# Patient Record
Sex: MALE | URBAN - METROPOLITAN AREA
[De-identification: names, ages, dates, MRNs, and addresses within clinical notes are randomized per-mention and may not be internally consistent; named-entity substitution may affect disease eponyms.]

---

## 2020-10-12 PROBLEM — Z00.00 ENCOUNTER FOR PREVENTIVE HEALTH EXAMINATION: Status: ACTIVE | Noted: 2020-10-12

## 2020-11-19 ENCOUNTER — OUTPATIENT (OUTPATIENT)
Dept: OUTPATIENT SERVICES | Facility: HOSPITAL | Age: 70
LOS: 1 days | End: 2020-11-19
Payer: MEDICARE

## 2020-11-19 VITALS
OXYGEN SATURATION: 98 % | DIASTOLIC BLOOD PRESSURE: 80 MMHG | WEIGHT: 220.9 LBS | TEMPERATURE: 97 F | HEIGHT: 73 IN | RESPIRATION RATE: 15 BRPM | HEART RATE: 65 BPM | SYSTOLIC BLOOD PRESSURE: 135 MMHG

## 2020-11-19 DIAGNOSIS — Z01.818 ENCOUNTER FOR OTHER PREPROCEDURAL EXAMINATION: ICD-10-CM

## 2020-11-19 DIAGNOSIS — K86.2 CYST OF PANCREAS: ICD-10-CM

## 2020-11-19 DIAGNOSIS — Z98.890 OTHER SPECIFIED POSTPROCEDURAL STATES: Chronic | ICD-10-CM

## 2020-11-19 DIAGNOSIS — E11.9 TYPE 2 DIABETES MELLITUS WITHOUT COMPLICATIONS: ICD-10-CM

## 2020-11-19 DIAGNOSIS — G47.33 OBSTRUCTIVE SLEEP APNEA (ADULT) (PEDIATRIC): ICD-10-CM

## 2020-11-19 LAB
ANION GAP SERPL CALC-SCNC: 12 MMO/L — SIGNIFICANT CHANGE UP (ref 7–14)
BUN SERPL-MCNC: 12 MG/DL — SIGNIFICANT CHANGE UP (ref 7–23)
CALCIUM SERPL-MCNC: 10.4 MG/DL — SIGNIFICANT CHANGE UP (ref 8.4–10.5)
CHLORIDE SERPL-SCNC: 98 MMOL/L — SIGNIFICANT CHANGE UP (ref 98–107)
CO2 SERPL-SCNC: 27 MMOL/L — SIGNIFICANT CHANGE UP (ref 22–31)
CREAT SERPL-MCNC: 0.8 MG/DL — SIGNIFICANT CHANGE UP (ref 0.5–1.3)
GLUCOSE SERPL-MCNC: 144 MG/DL — HIGH (ref 70–99)
HBA1C BLD-MCNC: 7.3 % — HIGH (ref 4–5.6)
HCT VFR BLD CALC: 50.2 % — HIGH (ref 39–50)
HGB BLD-MCNC: 16.1 G/DL — SIGNIFICANT CHANGE UP (ref 13–17)
MCHC RBC-ENTMCNC: 29.7 PG — SIGNIFICANT CHANGE UP (ref 27–34)
MCHC RBC-ENTMCNC: 32.1 % — SIGNIFICANT CHANGE UP (ref 32–36)
MCV RBC AUTO: 92.6 FL — SIGNIFICANT CHANGE UP (ref 80–100)
NRBC # FLD: 0 K/UL — SIGNIFICANT CHANGE UP (ref 0–0)
PLATELET # BLD AUTO: 285 K/UL — SIGNIFICANT CHANGE UP (ref 150–400)
PMV BLD: 9.2 FL — SIGNIFICANT CHANGE UP (ref 7–13)
POTASSIUM SERPL-MCNC: 4.4 MMOL/L — SIGNIFICANT CHANGE UP (ref 3.5–5.3)
POTASSIUM SERPL-SCNC: 4.4 MMOL/L — SIGNIFICANT CHANGE UP (ref 3.5–5.3)
RBC # BLD: 5.42 M/UL — SIGNIFICANT CHANGE UP (ref 4.2–5.8)
RBC # FLD: 13.3 % — SIGNIFICANT CHANGE UP (ref 10.3–14.5)
SODIUM SERPL-SCNC: 137 MMOL/L — SIGNIFICANT CHANGE UP (ref 135–145)
WBC # BLD: 7.95 K/UL — SIGNIFICANT CHANGE UP (ref 3.8–10.5)
WBC # FLD AUTO: 7.95 K/UL — SIGNIFICANT CHANGE UP (ref 3.8–10.5)

## 2020-11-19 PROCEDURE — 93010 ELECTROCARDIOGRAM REPORT: CPT

## 2020-11-19 RX ORDER — LISINOPRIL 2.5 MG/1
1 TABLET ORAL
Qty: 0 | Refills: 0 | DISCHARGE

## 2020-11-19 RX ORDER — GLIMEPIRIDE 1 MG
1 TABLET ORAL
Qty: 0 | Refills: 0 | DISCHARGE

## 2020-11-19 RX ORDER — TAMSULOSIN HYDROCHLORIDE 0.4 MG/1
1 CAPSULE ORAL
Qty: 0 | Refills: 0 | DISCHARGE

## 2020-11-19 NOTE — H&P PST ADULT - NEGATIVE OPHTHALMOLOGIC SYMPTOMS
no lacrimation L/no pain L/no blurred vision R/no lacrimation R/no discharge R/no irritation L/no diplopia/no loss of vision R/no photophobia/no blurred vision L/no irritation R/no discharge L/no loss of vision L/no pain R

## 2020-11-19 NOTE — H&P PST ADULT - NSICDXPROBLEM_GEN_ALL_CORE_FT
PROBLEM DIAGNOSES  Problem: Cyst of pancreas  Assessment and Plan: Scheduled for endoscopic ultrasound    Problem: Diabetes mellitus  Assessment and Plan: Monitor BS on day of surgery. Pt instructed not to take any diabetes medications on day of surgery. Pt verbalized understanding    Problem: INGRID on CPAP  Assessment and Plan: OR booking notified via fax       PROBLEM DIAGNOSES  Problem: Cyst of pancreas  Assessment and Plan: Scheduled for endoscopic ultrasound Fine Needle Aspiration Anestheasia on 11/23/2020. Pre op instructions, famotidine given and explained. Pt verbalized understanding.   Pt confirmed scheduled appt for Covid-19 swab pre op    Problem: Diabetes mellitus  Assessment and Plan: OR booking notified via fax. Monitor BS on day of surgery. Pt instructed not to take any diabetes medications on day of surgery. Pt verbalized understanding    Problem: INGRID on CPAP  Assessment and Plan: OR booking notified via fax

## 2020-11-19 NOTE — H&P PST ADULT - NSICDXPASTMEDICALHX_GEN_ALL_CORE_FT
PAST MEDICAL HISTORY:  BPH (benign prostatic hyperplasia)     Diverticulitis     HTN (hypertension)     INGRID on CPAP     Type 2 diabetes mellitus

## 2020-11-19 NOTE — H&P PST ADULT - NEGATIVE GENERAL GENITOURINARY SYMPTOMS
no bladder infections/no urine discoloration/no dysuria/no flank pain R/no hematuria/no renal colic/no flank pain L

## 2020-11-19 NOTE — H&P PST ADULT - RS GEN PE MLT RESP DETAILS PC
airway patent/no rales/no subcutaneous emphysema/no wheezes/good air movement/clear to auscultation bilaterally/breath sounds equal/no chest wall tenderness/no intercostal retractions/no rhonchi/respirations non-labored

## 2020-11-21 ENCOUNTER — APPOINTMENT (OUTPATIENT)
Dept: DISASTER EMERGENCY | Facility: CLINIC | Age: 70
End: 2020-11-21

## 2020-11-21 ENCOUNTER — LABORATORY RESULT (OUTPATIENT)
Age: 70
End: 2020-11-21

## 2020-11-23 ENCOUNTER — APPOINTMENT (OUTPATIENT)
Dept: GASTROENTEROLOGY | Facility: HOSPITAL | Age: 70
End: 2020-11-23

## 2020-11-23 ENCOUNTER — RESULT REVIEW (OUTPATIENT)
Age: 70
End: 2020-11-23

## 2020-11-23 ENCOUNTER — NON-APPOINTMENT (OUTPATIENT)
Age: 70
End: 2020-11-23

## 2020-11-23 ENCOUNTER — OUTPATIENT (OUTPATIENT)
Dept: OUTPATIENT SERVICES | Facility: HOSPITAL | Age: 70
LOS: 1 days | Discharge: HOME | End: 2020-11-23
Payer: MEDICARE

## 2020-11-23 VITALS
HEART RATE: 70 BPM | OXYGEN SATURATION: 96 % | HEIGHT: 73 IN | RESPIRATION RATE: 20 BRPM | SYSTOLIC BLOOD PRESSURE: 160 MMHG | DIASTOLIC BLOOD PRESSURE: 87 MMHG | WEIGHT: 225.09 LBS | TEMPERATURE: 99 F

## 2020-11-23 VITALS
HEART RATE: 65 BPM | DIASTOLIC BLOOD PRESSURE: 69 MMHG | OXYGEN SATURATION: 97 % | RESPIRATION RATE: 17 BRPM | SYSTOLIC BLOOD PRESSURE: 115 MMHG

## 2020-11-23 DIAGNOSIS — Z98.890 OTHER SPECIFIED POSTPROCEDURAL STATES: Chronic | ICD-10-CM

## 2020-11-23 DIAGNOSIS — K86.2 CYST OF PANCREAS: ICD-10-CM

## 2020-11-23 LAB
GLUCOSE BLDC GLUCOMTR-MCNC: 143 MG/DL — HIGH (ref 70–99)
GLUCOSE BLDC GLUCOMTR-MCNC: 160 MG/DL — HIGH (ref 70–99)

## 2020-11-23 PROCEDURE — 43242 EGD US FINE NEEDLE BX/ASPIR: CPT

## 2020-11-23 PROCEDURE — 88307 TISSUE EXAM BY PATHOLOGIST: CPT | Mod: 26

## 2020-11-23 RX ORDER — CIPROFLOXACIN HYDROCHLORIDE 500 MG/1
500 TABLET, FILM COATED ORAL
Qty: 6 | Refills: 0 | Status: ACTIVE | COMMUNITY
Start: 2020-11-23 | End: 1900-01-01

## 2020-11-23 NOTE — ASU PATIENT PROFILE, ADULT - PMH
BPH (benign prostatic hyperplasia)    Diverticulitis    HTN (hypertension)    INGRID on CPAP    Type 2 diabetes mellitus

## 2020-11-23 NOTE — ASU PATIENT PROFILE, ADULT - VISION (WITH CORRECTIVE LENSES IF THE PATIENT USUALLY WEARS THEM):
Partially impaired: cannot see medication labels or newsprint, but can see obstacles in path, and the surrounding layout; can count fingers at arm's length/READING

## 2020-11-24 ENCOUNTER — INPATIENT (INPATIENT)
Facility: HOSPITAL | Age: 70
LOS: 3 days | Discharge: HOME | End: 2020-11-28
Attending: STUDENT IN AN ORGANIZED HEALTH CARE EDUCATION/TRAINING PROGRAM | Admitting: STUDENT IN AN ORGANIZED HEALTH CARE EDUCATION/TRAINING PROGRAM
Payer: MEDICARE

## 2020-11-24 VITALS
HEIGHT: 73 IN | HEART RATE: 60 BPM | OXYGEN SATURATION: 97 % | SYSTOLIC BLOOD PRESSURE: 161 MMHG | DIASTOLIC BLOOD PRESSURE: 76 MMHG | RESPIRATION RATE: 16 BRPM | TEMPERATURE: 98 F

## 2020-11-24 DIAGNOSIS — I10 ESSENTIAL (PRIMARY) HYPERTENSION: ICD-10-CM

## 2020-11-24 DIAGNOSIS — Z02.9 ENCOUNTER FOR ADMINISTRATIVE EXAMINATIONS, UNSPECIFIED: ICD-10-CM

## 2020-11-24 DIAGNOSIS — K85.90 ACUTE PANCREATITIS WITHOUT NECROSIS OR INFECTION, UNSPECIFIED: ICD-10-CM

## 2020-11-24 DIAGNOSIS — E11.9 TYPE 2 DIABETES MELLITUS WITHOUT COMPLICATIONS: ICD-10-CM

## 2020-11-24 DIAGNOSIS — K59.00 CONSTIPATION, UNSPECIFIED: ICD-10-CM

## 2020-11-24 DIAGNOSIS — Z98.890 OTHER SPECIFIED POSTPROCEDURAL STATES: Chronic | ICD-10-CM

## 2020-11-24 DIAGNOSIS — Z29.9 ENCOUNTER FOR PROPHYLACTIC MEASURES, UNSPECIFIED: ICD-10-CM

## 2020-11-24 PROBLEM — K57.92 DIVERTICULITIS OF INTESTINE, PART UNSPECIFIED, WITHOUT PERFORATION OR ABSCESS WITHOUT BLEEDING: Chronic | Status: ACTIVE | Noted: 2020-11-19

## 2020-11-24 PROBLEM — N40.0 BENIGN PROSTATIC HYPERPLASIA WITHOUT LOWER URINARY TRACT SYMPTOMS: Chronic | Status: ACTIVE | Noted: 2020-11-19

## 2020-11-24 PROBLEM — G47.33 OBSTRUCTIVE SLEEP APNEA (ADULT) (PEDIATRIC): Chronic | Status: ACTIVE | Noted: 2020-11-19

## 2020-11-24 LAB
ALBUMIN SERPL ELPH-MCNC: 4.4 G/DL — SIGNIFICANT CHANGE UP (ref 3.3–5)
ALP SERPL-CCNC: 55 U/L — SIGNIFICANT CHANGE UP (ref 40–120)
ALT FLD-CCNC: 28 U/L — SIGNIFICANT CHANGE UP (ref 4–41)
ANION GAP SERPL CALC-SCNC: 10 MMO/L — SIGNIFICANT CHANGE UP (ref 7–14)
ANISOCYTOSIS BLD QL: SLIGHT — SIGNIFICANT CHANGE UP
APTT BLD: 30.8 SEC — SIGNIFICANT CHANGE UP (ref 27–36.3)
AST SERPL-CCNC: 19 U/L — SIGNIFICANT CHANGE UP (ref 4–40)
BASE EXCESS BLDV CALC-SCNC: 3 MMOL/L — SIGNIFICANT CHANGE UP
BASOPHILS # BLD AUTO: 0.07 K/UL — SIGNIFICANT CHANGE UP (ref 0–0.2)
BASOPHILS NFR BLD AUTO: 0.4 % — SIGNIFICANT CHANGE UP (ref 0–2)
BASOPHILS NFR SPEC: 0 % — SIGNIFICANT CHANGE UP (ref 0–2)
BILIRUB SERPL-MCNC: 0.7 MG/DL — SIGNIFICANT CHANGE UP (ref 0.2–1.2)
BLD GP AB SCN SERPL QL: NEGATIVE — SIGNIFICANT CHANGE UP
BLOOD GAS VENOUS - CREATININE: 0.83 MG/DL — SIGNIFICANT CHANGE UP (ref 0.5–1.3)
BUN SERPL-MCNC: 10 MG/DL — SIGNIFICANT CHANGE UP (ref 7–23)
CALCIUM SERPL-MCNC: 9.9 MG/DL — SIGNIFICANT CHANGE UP (ref 8.4–10.5)
CHLORIDE BLDV-SCNC: 102 MMOL/L — SIGNIFICANT CHANGE UP (ref 96–108)
CHLORIDE SERPL-SCNC: 97 MMOL/L — LOW (ref 98–107)
CO2 SERPL-SCNC: 28 MMOL/L — SIGNIFICANT CHANGE UP (ref 22–31)
CREAT SERPL-MCNC: 0.77 MG/DL — SIGNIFICANT CHANGE UP (ref 0.5–1.3)
EOSINOPHIL # BLD AUTO: 0.01 K/UL — SIGNIFICANT CHANGE UP (ref 0–0.5)
EOSINOPHIL NFR BLD AUTO: 0.1 % — SIGNIFICANT CHANGE UP (ref 0–6)
EOSINOPHIL NFR FLD: 0 % — SIGNIFICANT CHANGE UP (ref 0–6)
GAS PNL BLDV: 134 MMOL/L — LOW (ref 136–146)
GLUCOSE BLDV-MCNC: 168 MG/DL — HIGH (ref 70–99)
GLUCOSE SERPL-MCNC: 177 MG/DL — HIGH (ref 70–99)
HBA1C BLD-MCNC: 7 % — HIGH (ref 4–5.6)
HCO3 BLDV-SCNC: 25 MMOL/L — SIGNIFICANT CHANGE UP (ref 20–27)
HCT VFR BLD CALC: 48.6 % — SIGNIFICANT CHANGE UP (ref 39–50)
HCT VFR BLDV CALC: 51.2 % — HIGH (ref 39–51)
HGB BLD-MCNC: 16.2 G/DL — SIGNIFICANT CHANGE UP (ref 13–17)
HGB BLDV-MCNC: 16.7 G/DL — SIGNIFICANT CHANGE UP (ref 13–17)
IMM GRANULOCYTES NFR BLD AUTO: 0.5 % — SIGNIFICANT CHANGE UP (ref 0–1.5)
INR BLD: 1.16 — SIGNIFICANT CHANGE UP (ref 0.88–1.16)
LACTATE BLDV-MCNC: 1.7 MMOL/L — SIGNIFICANT CHANGE UP (ref 0.5–2)
LIDOCAIN IGE QN: 385.8 U/L — HIGH (ref 7–60)
LYMPHOCYTES # BLD AUTO: 1.74 K/UL — SIGNIFICANT CHANGE UP (ref 1–3.3)
LYMPHOCYTES # BLD AUTO: 9.7 % — LOW (ref 13–44)
LYMPHOCYTES NFR SPEC AUTO: 1.8 % — LOW (ref 13–44)
MACROCYTES BLD QL: SLIGHT — SIGNIFICANT CHANGE UP
MCHC RBC-ENTMCNC: 30.2 PG — SIGNIFICANT CHANGE UP (ref 27–34)
MCHC RBC-ENTMCNC: 33.3 % — SIGNIFICANT CHANGE UP (ref 32–36)
MCV RBC AUTO: 90.5 FL — SIGNIFICANT CHANGE UP (ref 80–100)
MONOCYTES # BLD AUTO: 1.69 K/UL — HIGH (ref 0–0.9)
MONOCYTES NFR BLD AUTO: 9.4 % — SIGNIFICANT CHANGE UP (ref 2–14)
MONOCYTES NFR BLD: 8 % — SIGNIFICANT CHANGE UP (ref 2–9)
NEUTROPHIL AB SER-ACNC: 86.7 % — HIGH (ref 43–77)
NEUTROPHILS # BLD AUTO: 14.39 K/UL — HIGH (ref 1.8–7.4)
NEUTROPHILS NFR BLD AUTO: 79.9 % — HIGH (ref 43–77)
NEUTS BAND # BLD: 0.9 % — SIGNIFICANT CHANGE UP (ref 0–6)
NRBC # FLD: 0 K/UL — SIGNIFICANT CHANGE UP (ref 0–0)
PCO2 BLDV: 47 MMHG — SIGNIFICANT CHANGE UP (ref 41–51)
PH BLDV: 7.39 PH — SIGNIFICANT CHANGE UP (ref 7.32–7.43)
PLATELET # BLD AUTO: 266 K/UL — SIGNIFICANT CHANGE UP (ref 150–400)
PLATELET COUNT - ESTIMATE: NORMAL — SIGNIFICANT CHANGE UP
PMV BLD: 8.7 FL — SIGNIFICANT CHANGE UP (ref 7–13)
PO2 BLDV: 27 MMHG — LOW (ref 35–40)
POTASSIUM BLDV-SCNC: 3.8 MMOL/L — SIGNIFICANT CHANGE UP (ref 3.4–4.5)
POTASSIUM SERPL-MCNC: 4 MMOL/L — SIGNIFICANT CHANGE UP (ref 3.5–5.3)
POTASSIUM SERPL-SCNC: 4 MMOL/L — SIGNIFICANT CHANGE UP (ref 3.5–5.3)
PROT SERPL-MCNC: 7.6 G/DL — SIGNIFICANT CHANGE UP (ref 6–8.3)
PROTHROM AB SERPL-ACNC: 13.2 SEC — SIGNIFICANT CHANGE UP (ref 10.6–13.6)
RBC # BLD: 5.37 M/UL — SIGNIFICANT CHANGE UP (ref 4.2–5.8)
RBC # FLD: 13.2 % — SIGNIFICANT CHANGE UP (ref 10.3–14.5)
REVIEW TO FOLLOW: YES — SIGNIFICANT CHANGE UP
RH IG SCN BLD-IMP: POSITIVE — SIGNIFICANT CHANGE UP
SAO2 % BLDV: 46 % — LOW (ref 60–85)
SARS-COV-2 RNA SPEC QL NAA+PROBE: SIGNIFICANT CHANGE UP
SODIUM SERPL-SCNC: 135 MMOL/L — SIGNIFICANT CHANGE UP (ref 135–145)
TRIGL SERPL-MCNC: 56 MG/DL — SIGNIFICANT CHANGE UP (ref 10–149)
VARIANT LYMPHS # BLD: 2.6 % — SIGNIFICANT CHANGE UP
WBC # BLD: 17.99 K/UL — HIGH (ref 3.8–10.5)
WBC # FLD AUTO: 17.99 K/UL — HIGH (ref 3.8–10.5)

## 2020-11-24 PROCEDURE — 76705 ECHO EXAM OF ABDOMEN: CPT | Mod: 26

## 2020-11-24 PROCEDURE — 99285 EMERGENCY DEPT VISIT HI MDM: CPT | Mod: 25

## 2020-11-24 PROCEDURE — 99222 1ST HOSP IP/OBS MODERATE 55: CPT | Mod: GC

## 2020-11-24 PROCEDURE — 74019 RADEX ABDOMEN 2 VIEWS: CPT | Mod: 26

## 2020-11-24 PROCEDURE — 74177 CT ABD & PELVIS W/CONTRAST: CPT | Mod: 26

## 2020-11-24 PROCEDURE — 99223 1ST HOSP IP/OBS HIGH 75: CPT | Mod: GC,AI

## 2020-11-24 RX ORDER — HYDROMORPHONE HYDROCHLORIDE 2 MG/ML
1 INJECTION INTRAMUSCULAR; INTRAVENOUS; SUBCUTANEOUS EVERY 6 HOURS
Refills: 0 | Status: DISCONTINUED | OUTPATIENT
Start: 2020-11-24 | End: 2020-11-28

## 2020-11-24 RX ORDER — SODIUM CHLORIDE 9 MG/ML
1000 INJECTION INTRAMUSCULAR; INTRAVENOUS; SUBCUTANEOUS ONCE
Refills: 0 | Status: COMPLETED | OUTPATIENT
Start: 2020-11-24 | End: 2020-11-24

## 2020-11-24 RX ORDER — PIPERACILLIN AND TAZOBACTAM 4; .5 G/20ML; G/20ML
3.38 INJECTION, POWDER, LYOPHILIZED, FOR SOLUTION INTRAVENOUS ONCE
Refills: 0 | Status: COMPLETED | OUTPATIENT
Start: 2020-11-24 | End: 2020-11-24

## 2020-11-24 RX ORDER — DEXTROSE 50 % IN WATER 50 %
25 SYRINGE (ML) INTRAVENOUS ONCE
Refills: 0 | Status: DISCONTINUED | OUTPATIENT
Start: 2020-11-24 | End: 2020-11-25

## 2020-11-24 RX ORDER — SODIUM CHLORIDE 9 MG/ML
1000 INJECTION, SOLUTION INTRAVENOUS
Refills: 0 | Status: DISCONTINUED | OUTPATIENT
Start: 2020-11-24 | End: 2020-11-25

## 2020-11-24 RX ORDER — SENNA PLUS 8.6 MG/1
2 TABLET ORAL AT BEDTIME
Refills: 0 | Status: DISCONTINUED | OUTPATIENT
Start: 2020-11-24 | End: 2020-11-28

## 2020-11-24 RX ORDER — OXYCODONE AND ACETAMINOPHEN 5; 325 MG/1; MG/1
1 TABLET ORAL EVERY 4 HOURS
Refills: 0 | Status: DISCONTINUED | OUTPATIENT
Start: 2020-11-24 | End: 2020-11-28

## 2020-11-24 RX ORDER — HEPARIN SODIUM 5000 [USP'U]/ML
5000 INJECTION INTRAVENOUS; SUBCUTANEOUS EVERY 8 HOURS
Refills: 0 | Status: DISCONTINUED | OUTPATIENT
Start: 2020-11-24 | End: 2020-11-26

## 2020-11-24 RX ORDER — MORPHINE SULFATE 50 MG/1
4 CAPSULE, EXTENDED RELEASE ORAL ONCE
Refills: 0 | Status: DISCONTINUED | OUTPATIENT
Start: 2020-11-24 | End: 2020-11-24

## 2020-11-24 RX ORDER — DEXTROSE 50 % IN WATER 50 %
12.5 SYRINGE (ML) INTRAVENOUS ONCE
Refills: 0 | Status: DISCONTINUED | OUTPATIENT
Start: 2020-11-24 | End: 2020-11-25

## 2020-11-24 RX ORDER — INSULIN LISPRO 100/ML
VIAL (ML) SUBCUTANEOUS
Refills: 0 | Status: DISCONTINUED | OUTPATIENT
Start: 2020-11-24 | End: 2020-11-24

## 2020-11-24 RX ORDER — DEXTROSE 50 % IN WATER 50 %
15 SYRINGE (ML) INTRAVENOUS ONCE
Refills: 0 | Status: DISCONTINUED | OUTPATIENT
Start: 2020-11-24 | End: 2020-11-25

## 2020-11-24 RX ORDER — GLUCAGON INJECTION, SOLUTION 0.5 MG/.1ML
1 INJECTION, SOLUTION SUBCUTANEOUS ONCE
Refills: 0 | Status: DISCONTINUED | OUTPATIENT
Start: 2020-11-24 | End: 2020-11-25

## 2020-11-24 RX ORDER — HYDROMORPHONE HYDROCHLORIDE 2 MG/ML
1 INJECTION INTRAMUSCULAR; INTRAVENOUS; SUBCUTANEOUS ONCE
Refills: 0 | Status: DISCONTINUED | OUTPATIENT
Start: 2020-11-24 | End: 2020-11-24

## 2020-11-24 RX ORDER — TAMSULOSIN HYDROCHLORIDE 0.4 MG/1
0.4 CAPSULE ORAL AT BEDTIME
Refills: 0 | Status: DISCONTINUED | OUTPATIENT
Start: 2020-11-24 | End: 2020-11-28

## 2020-11-24 RX ORDER — SODIUM CHLORIDE 9 MG/ML
1000 INJECTION, SOLUTION INTRAVENOUS
Refills: 0 | Status: DISCONTINUED | OUTPATIENT
Start: 2020-11-24 | End: 2020-11-26

## 2020-11-24 RX ORDER — INSULIN LISPRO 100/ML
VIAL (ML) SUBCUTANEOUS EVERY 6 HOURS
Refills: 0 | Status: DISCONTINUED | OUTPATIENT
Start: 2020-11-24 | End: 2020-11-25

## 2020-11-24 RX ORDER — INSULIN LISPRO 100/ML
VIAL (ML) SUBCUTANEOUS AT BEDTIME
Refills: 0 | Status: DISCONTINUED | OUTPATIENT
Start: 2020-11-24 | End: 2020-11-24

## 2020-11-24 RX ORDER — LISINOPRIL 2.5 MG/1
10 TABLET ORAL DAILY
Refills: 0 | Status: DISCONTINUED | OUTPATIENT
Start: 2020-11-24 | End: 2020-11-28

## 2020-11-24 RX ORDER — POLYETHYLENE GLYCOL 3350 17 G/17G
17 POWDER, FOR SOLUTION ORAL DAILY
Refills: 0 | Status: DISCONTINUED | OUTPATIENT
Start: 2020-11-24 | End: 2020-11-28

## 2020-11-24 RX ORDER — ACETAMINOPHEN 500 MG
650 TABLET ORAL EVERY 6 HOURS
Refills: 0 | Status: DISCONTINUED | OUTPATIENT
Start: 2020-11-24 | End: 2020-11-28

## 2020-11-24 RX ADMIN — MORPHINE SULFATE 4 MILLIGRAM(S): 50 CAPSULE, EXTENDED RELEASE ORAL at 09:59

## 2020-11-24 RX ADMIN — TAMSULOSIN HYDROCHLORIDE 0.4 MILLIGRAM(S): 0.4 CAPSULE ORAL at 21:23

## 2020-11-24 RX ADMIN — SODIUM CHLORIDE 150 MILLILITER(S): 9 INJECTION, SOLUTION INTRAVENOUS at 18:49

## 2020-11-24 RX ADMIN — HYDROMORPHONE HYDROCHLORIDE 1 MILLIGRAM(S): 2 INJECTION INTRAMUSCULAR; INTRAVENOUS; SUBCUTANEOUS at 21:38

## 2020-11-24 RX ADMIN — SODIUM CHLORIDE 1000 MILLILITER(S): 9 INJECTION INTRAMUSCULAR; INTRAVENOUS; SUBCUTANEOUS at 13:48

## 2020-11-24 RX ADMIN — SENNA PLUS 2 TABLET(S): 8.6 TABLET ORAL at 21:23

## 2020-11-24 RX ADMIN — SODIUM CHLORIDE 1000 MILLILITER(S): 9 INJECTION INTRAMUSCULAR; INTRAVENOUS; SUBCUTANEOUS at 09:59

## 2020-11-24 RX ADMIN — PIPERACILLIN AND TAZOBACTAM 200 GRAM(S): 4; .5 INJECTION, POWDER, LYOPHILIZED, FOR SOLUTION INTRAVENOUS at 16:58

## 2020-11-24 RX ADMIN — MORPHINE SULFATE 4 MILLIGRAM(S): 50 CAPSULE, EXTENDED RELEASE ORAL at 13:30

## 2020-11-24 RX ADMIN — HYDROMORPHONE HYDROCHLORIDE 1 MILLIGRAM(S): 2 INJECTION INTRAMUSCULAR; INTRAVENOUS; SUBCUTANEOUS at 14:02

## 2020-11-24 RX ADMIN — MORPHINE SULFATE 4 MILLIGRAM(S): 50 CAPSULE, EXTENDED RELEASE ORAL at 11:30

## 2020-11-24 RX ADMIN — HYDROMORPHONE HYDROCHLORIDE 1 MILLIGRAM(S): 2 INJECTION INTRAMUSCULAR; INTRAVENOUS; SUBCUTANEOUS at 21:23

## 2020-11-24 RX ADMIN — HEPARIN SODIUM 5000 UNIT(S): 5000 INJECTION INTRAVENOUS; SUBCUTANEOUS at 21:23

## 2020-11-24 RX ADMIN — MORPHINE SULFATE 4 MILLIGRAM(S): 50 CAPSULE, EXTENDED RELEASE ORAL at 11:17

## 2020-11-24 RX ADMIN — SODIUM CHLORIDE 1000 MILLILITER(S): 9 INJECTION INTRAMUSCULAR; INTRAVENOUS; SUBCUTANEOUS at 11:17

## 2020-11-24 NOTE — ED PROVIDER NOTE - PHYSICAL EXAMINATION
GEN - NAD; nontoxic appearing; A+O x3   HEAD - NC/AT   EYES- PERRL, EOMI  ENT: Airway patent, mmm, Oral cavity and pharynx normal. No inflammation, swelling, exudate, or lesions.    NECK: Neck supple, no masses.  PULMONARY - CTA b/l, symmetric breath sounds.   CARDIAC -s1s2, RRR, no M,G,R  ABDOMEN - +BS, +mild distention, diffusely tender, soft, no guarding, no rebound, no masses   BACK - no CVA tenderness, Normal  spine   EXTREMITIES - FROM, symmetric pulses, capillary refill < 2 seconds, no edema   SKIN - no rash or bruising   NEUROLOGIC - alert, speech clear, no focal deficits  PSYCH -nl mood/affect, nl insight.

## 2020-11-24 NOTE — ED PROVIDER NOTE - NS ED ROS FT
ROS:  GENERAL: No fever, no chills  EYES: no change in vision  HEENT: no trouble swallowing, no trouble speaking  CARDIAC: no chest pain  PULMONARY: no cough, no shortness of breath  GI: + abdominal pain, + nausea, no vomiting, no diarrhea, no constipation  : No dysuria, no frequency, no change in appearance, or odor of urine  SKIN: no rashes  NEURO: no headache, no weakness  MSK: No joint pain

## 2020-11-24 NOTE — ED PROCEDURE NOTE - ATTENDING CONTRIBUTION TO CARE
I performed a face to face evaluation of this patient and obtained a history and performed a full exam.  I agree with the history, physical exam and plan of the PA.
I performed a face to face evaluation of this patient and obtained a history and performed a full exam.  I agree with the history, physical exam and plan of the PA.

## 2020-11-24 NOTE — H&P ADULT - ATTENDING COMMENTS
70 year old male with hx of HTN, DM2, diverticulosis, BPH who presents with abdominal pain after a pancreatic cyst biopsy yesterday, found to have pancreatitis. Lipase 382 and CT scan with imaging significant for pancreatitis and gallstones. GI following. LR at 150cc/hr, NPO, pain control with dilaudid 1mg q4 hours prn pain. F/u triglycerides and DM2, hold home meds and start ISS. BPH, c/w flomax. HTN. c/w lisinopril. Constipation, start bowel regimen.

## 2020-11-24 NOTE — H&P ADULT - NSHPREVIEWOFSYSTEMS_GEN_ALL_CORE
REVIEW OF SYSTEMS:  CONSTITUTIONAL: No fever, chills, night sweats, or fatigue  EYES: No eye pain, visual disturbances, or discharge  ENMT:  No difficulty hearing, tinnitus, vertigo; No sinus or throat pain  NECK: No pain or stiffness  BREASTS: No pain, masses, or nipple discharge  RESPIRATORY: No cough, wheezing, or hemoptysis; No shortness of breath  CARDIOVASCULAR: No chest pain, palpitations, dizziness, or leg swelling  GASTROINTESTINAL: + abdominal pain, + nausea, No vomiting, or hematemesis; No diarrhea or constipation. No melena or hematochezia.  GENITOURINARY: No dysuria, frequency, hematuria, or incontinence  NEUROLOGICAL: No headaches, memory loss, loss of strength, numbness, or tremors  SKIN: No itching, burning, rashes, or lesions   LYMPH NODES: No enlarged glands  ENDOCRINE: No heat or cold intolerance; No hair loss  MUSCULOSKELETAL: No joint pain or swelling; No muscle, back, or extremity pain  PSYCHIATRIC: No depression, anxiety, mood swings, or difficulty sleeping  HEME/LYMPH: No easy bruising, or bleeding gums  ALLERY AND IMMUNOLOGIC: No hives or eczema

## 2020-11-24 NOTE — H&P ADULT - NSHPSOCIALHISTORY_GEN_ALL_CORE
Social History:    Marital Status:  (   )    (   ) Single    (   )    (  )   Occupation:   Lives with: (  ) alone  (  ) children   (  ) spouse   (  ) parents  (  ) other    Substance Use (street drugs): ( X ) never used  (  ) other:  Tobacco Usage:  ( X  ) never smoked   (   ) former smoker   (   ) current smoker  (     ) pack year  (        ) last cigarette date  Alcohol Usage: Social drinking  Sexual History:

## 2020-11-24 NOTE — ED ADULT NURSE NOTE - OBJECTIVE STATEMENT
Pt presents to ED ambulatory from home with c/o abdominal pain radiating to back x 1 day s/p pancreatic biopsy. Pt states he has hx of diverticulosis and had CT scan which showed a cyst on the pancreas, prompting the biopsy. Pt is A&OX4, skin warm dry unremarkable, + strong regular radial pulses bi lat. Pt denies chest pain, difficulty breathing, shortness of breath, fever, cough, chills. Pt relates abdomen appears more distended than usual and has been feeling nauseous but denies vomiting. Pt has hx of HTN and DM and is compliant with medications. Pt changed into gown and placed on cardiac monitor. Will continue to monitor. Pt presents to ED ambulatory from home with c/o abdominal pain radiating to back x 1 day s/p pancreatic biopsy. Pt states he has hx of diverticulosis and had CT scan which showed a cyst on the pancreas, prompting the biopsy. Pt is A&OX4, skin warm dry unremarkable, + strong regular radial pulses bi lat. Pt denies chest pain, difficulty breathing, shortness of breath, fever, cough, chills. Pt relates abdomen appears more distended than usual and has been feeling nauseous but denies vomiting. Pt has hx of HTN and DM and is compliant with medications. Pt changed into gown and placed on cardiac monitor. #18g IV placed to LAC, lab work collected as ordered. Will continue to monitor.

## 2020-11-24 NOTE — CONSULT NOTE ADULT - SUBJECTIVE AND OBJECTIVE BOX
Chief Complaint:  Patient is a 70y old  Male who presents with a chief complaint of     HPI:    70 year old M presents with abdominal pain s/p EUS/FNA yesterday. Patient was feeling well post procedure, however after discharge felt severe diffuse abd pain that was constant, associated with nausea. Pt had the EUS for notable 3cm pancreatic cyst possibly IPMN and advised to have EUS. Patient has had intermittent abdominal pain x1 month, described the pain as mild, pressure like in the epigastric region with no radiation. EUS showed at least 2.5cm cystic lesion in uncinate of the pancreas w an at least 0.8mm mural nodule, likely consistent with side branch IPMN, biopsed, also numerous gallstone.    Here lab is as shown: WBC 17.99, lipase 385. liver enzymes wnl.     Allergies:  No Known Allergies      Home Medications:    Hospital Medications:      PMHX/PSHX:  INGRID on CPAP    BPH (benign prostatic hyperplasia)    Diverticulitis    Type 2 diabetes mellitus    HTN (hypertension)    H/O inguinal hernia repair        Family history:      There is no family history of peptic ulcer disease, gastric cancer, colon polyps, colon cancer, celiac disease, biliary, hepatic, or pancreatic disease.  None of the female relatives have breast, uterine, or ovarian cancer.     Social History:     ROS:     General:  No wt loss, fevers, chills, night sweats, fatigue,   Eyes:  Good vision, no reported pain  ENT:  No sore throat, pain, runny nose, dysphagia  CV:  No pain, palpitations, hypo/hypertension  Resp:  No dyspnea, cough, tachypnea, wheezing  GI: See HPI   :  No pain, bleeding, incontinence, nocturia  Muscle:  No pain, weakness  Neuro:  No weakness, tingling, memory problems  Psych:  No fatigue, insomnia, mood problems, depression  Endocrine:  No polyuria, polydipsia, cold/heat intolerance  Heme:  No petechiae, ecchymosis, easy bruisability  Skin:  No rash, tattoos, scars, edema      PHYSICAL EXAM:     GENERAL:  Appears stated age, well-groomed  HEENT:  NC/AT,  conjunctivae clear and pink, no thyromegaly  CHEST:  Full & symmetric excursion, no increased effort, breath sounds clear  HEART:  Regular rhythm, S1, S2, no murmur/rub/S3/S4  ABDOMEN:  Soft, TTP throughout, non-distended, normoactive bowel sounds  EXTEREMITIES:  no cyanosis,clubbing or edema  SKIN:  No rash/erythema/ecchymoses  NEURO:  Alert, oriented, no asterixis    Vital Signs:  Vital Signs Last 24 Hrs  T(C): 36.7 (24 Nov 2020 09:03), Max: 36.7 (24 Nov 2020 09:03)  T(F): 98 (24 Nov 2020 09:03), Max: 98 (24 Nov 2020 09:03)  HR: 60 (24 Nov 2020 09:03) (60 - 60)  BP: 161/76 (24 Nov 2020 09:03) (161/76 - 161/76)  BP(mean): --  RR: 16 (24 Nov 2020 09:03) (16 - 16)  SpO2: 97% (24 Nov 2020 09:03) (97% - 97%)  Daily Height in cm: 185.42 (24 Nov 2020 09:03)    Daily     LABS:                        16.2   17.99 )-----------( 266      ( 24 Nov 2020 09:55 )             48.6     Mean Cell Volume: 90.5 fL (11-24-20 @ 09:55)    11-24    135  |  97<L>  |  10  ----------------------------<  177<H>  4.0   |  28  |  0.77    Ca    9.9      24 Nov 2020 09:55    TPro  7.6  /  Alb  4.4  /  TBili  0.7  /  DBili  x   /  AST  19  /  ALT  28  /  AlkPhos  55  11-24    LIVER FUNCTIONS - ( 24 Nov 2020 09:55 )  Alb: 4.4 g/dL / Pro: 7.6 g/dL / ALK PHOS: 55 u/L / ALT: 28 u/L / AST: 19 u/L / GGT: x           PT/INR - ( 24 Nov 2020 09:55 )   PT: 13.2 SEC;   INR: 1.16          PTT - ( 24 Nov 2020 09:55 )  PTT:30.8 SEC    Amylase Serum--      Lipase [id number]       Ammonia--                          16.2   17.99 )-----------( 266      ( 24 Nov 2020 09:55 )             48.6     Imaging:             Chief Complaint:  Patient is a 70y old  Male who presents with a chief complaint of     HPI:    70 year old M presents with abdominal pain s/p EUS/FNA yesterday. Patient was feeling well post procedure, however after discharge felt severe diffuse abd pain that was constant, associated with nausea. Pt had the EUS for notable 3cm pancreatic cyst possibly IPMN and advised to have EUS. Patient has had intermittent abdominal pain x1 month, described the pain as mild, pressure like in the epigastric region with no radiation. EUS showed at least 2.5cm cystic lesion in uncinate of the pancreas w an at least 0.8mm mural nodule, likely consistent with side branch IPMN, biopsed, also numerous gallstone.    Here lab is as shown: WBC 17.99, lipase 385. liver enzymes wnl.     Allergies:  No Known Allergies      Home Medications:    Hospital Medications:      PMHX/PSHX:  INGRID on CPAP    BPH (benign prostatic hyperplasia)    Diverticulitis    Type 2 diabetes mellitus    HTN (hypertension)    H/O inguinal hernia repair        Family history:      There is no family history of peptic ulcer disease, gastric cancer, colon polyps, colon cancer, celiac disease, biliary, hepatic, or pancreatic disease.  None of the female relatives have breast, uterine, or ovarian cancer.     Social History:     ROS:     General:  No wt loss, fevers, chills, night sweats, fatigue,   Eyes:  Good vision, no reported pain  ENT:  No sore throat, pain, runny nose, dysphagia  CV:  No pain, palpitations, hypo/hypertension  Resp:  No dyspnea, cough, tachypnea, wheezing  GI: See HPI   :  No pain, bleeding, incontinence, nocturia  Muscle:  No pain, weakness  Neuro:  No weakness, tingling, memory problems  Psych:  No fatigue, insomnia, mood problems, depression  Endocrine:  No polyuria, polydipsia, cold/heat intolerance  Heme:  No petechiae, ecchymosis, easy bruisability  Skin:  No rash, tattoos, scars, edema      PHYSICAL EXAM:     GENERAL:  Appears stated age, well-groomed  HEENT:  NC/AT,  conjunctivae clear and pink, no thyromegaly  CHEST:  Full & symmetric excursion, no increased effort, breath sounds clear  HEART:  Regular rhythm, S1, S2, no murmur/rub/S3/S4  ABDOMEN:  Soft, TTP throughout, non-distended, normoactive bowel sounds  EXTEREMITIES:  no cyanosis,clubbing or edema  SKIN:  No rash/erythema/ecchymoses  NEURO:  Alert, oriented, no asterixis    Vital Signs:  Vital Signs Last 24 Hrs  T(C): 36.7 (24 Nov 2020 09:03), Max: 36.7 (24 Nov 2020 09:03)  T(F): 98 (24 Nov 2020 09:03), Max: 98 (24 Nov 2020 09:03)  HR: 60 (24 Nov 2020 09:03) (60 - 60)  BP: 161/76 (24 Nov 2020 09:03) (161/76 - 161/76)  BP(mean): --  RR: 16 (24 Nov 2020 09:03) (16 - 16)  SpO2: 97% (24 Nov 2020 09:03) (97% - 97%)  Daily Height in cm: 185.42 (24 Nov 2020 09:03)    Daily     LABS:                        16.2   17.99 )-----------( 266      ( 24 Nov 2020 09:55 )             48.6     Mean Cell Volume: 90.5 fL (11-24-20 @ 09:55)    11-24    135  |  97<L>  |  10  ----------------------------<  177<H>  4.0   |  28  |  0.77    Ca    9.9      24 Nov 2020 09:55    TPro  7.6  /  Alb  4.4  /  TBili  0.7  /  DBili  x   /  AST  19  /  ALT  28  /  AlkPhos  55  11-24    LIVER FUNCTIONS - ( 24 Nov 2020 09:55 )  Alb: 4.4 g/dL / Pro: 7.6 g/dL / ALK PHOS: 55 u/L / ALT: 28 u/L / AST: 19 u/L / GGT: x           PT/INR - ( 24 Nov 2020 09:55 )   PT: 13.2 SEC;   INR: 1.16          PTT - ( 24 Nov 2020 09:55 )  PTT:30.8 SEC    Amylase Serum--      Lipase [id number]       Ammonia--                          16.2   17.99 )-----------( 266      ( 24 Nov 2020 09:55 )             48.6     Imaging:    < from: Upper EUS (11.23.20 @ 08:55) >  Findings:       EGD: :       The examined esophagus was normal.       The Z-line was regular.      The entire examine stomach was normal.       The examined duodenum was normal.       EUS:       The exam was performed with a linear echoendoscope.       There was a 2.5cm cystic lesion in the uncinate process of the pancreas.        It was anechoic with a large mural nodule that measured at least 8.6mm        largest diameter. This was biopsied with a 22g Gendel FNB needle x 2        passes with tissue and mucin aquisition. This was sent for pathology.       The PD was normal in course and caliber.       The rest of the pancreatic parenchyma appeared normal.       The gallstone had a large number of shadowing gallstones.       The examined portions of the liver and spleen looked normal.                                   Impression:          - Normal upper endoscopy.                       - At least 2.5cm cystic lesion in the uncinate of the                        pancreas with an at least 0.8mm mural nodule, likely       consistent with side branch IPMN. Biopsied for histology.                       - Numerous gallstones.  Recommendation:      - Discharge patient to home (ambulatory).                       - Followup pathology.                       - Pending path, referral to pancreas center.                                                                                     < end of copied text >

## 2020-11-24 NOTE — H&P ADULT - NSHPLABSRESULTS_GEN_ALL_CORE
The Labs were reviewed by me   The Radiology was reviewed by me    EKG tracing reviewed by me    11-24    135  |  97<L>  |  10  ----------------------------<  177<H>  4.0   |  28  |  0.77    Ca    9.9      24 Nov 2020 09:55    TPro  7.6  /  Alb  4.4  /  TBili  0.7  /  DBili  x   /  AST  19  /  ALT  28  /  AlkPhos  55  11-24          PT/INR - ( 24 Nov 2020 09:55 )   PT: 13.2 SEC;   INR: 1.16          PTT - ( 24 Nov 2020 09:55 )  PTT:30.8 SEC                                        16.2   17.99 )-----------( 266      ( 24 Nov 2020 09:55 )             48.6     CAPILLARY BLOOD GLUCOSE      POCT Blood Glucose.: 187 mg/dL (24 Nov 2020 09:08)    < from: CT Abdomen and Pelvis w/ Oral Cont and w/ IV Cont (11.24.20 @ 12:39) >    EXAM:  CT ABDOMEN AND PELVIS OC IC        PROCEDURE DATE:  Nov 24 2020         INTERPRETATION:  CLINICAL INFORMATION: Status post EUS/FNA yesterday now with severe diffuse abdominal pain and nausea. EUS performed for 2.5 cm cystic lesion in the uncinate process of the pancreas with a mural nodule.    COMPARISON: None.    PROCEDURE:  CT of the Abdomen and Pelvis was performed with intravenous contrast.  Intravenous contrast: 90 ml Omnipaque 350. 10 ml discarded.  Oral contrast: positive contrastwas administered.  Sagittal and coronal reformats were performed.    FINDINGS:  LOWER CHEST: Bibasilar atelectasis. Indeterminate 0.6 cm right middle lobe nodule (2:4).    LIVER: Diffuse hepatic steatosis.  BILE DUCTS: Mild enhancement of the wall ofthe common bile duct.  GALLBLADDER: Cholelithiasis.  SPLEEN: Within normal limits.  PANCREAS: Pancreatic head is enlarged and diffusely heterogeneous in appearance with infiltration of the peripancreatic fat.  A  3 x 1.4 cm complex cystic lesion is noted in the pancreatic head. A small amount of fluid is noted in the right retroperitoneum and tracking towards the pelvis.  ADRENALS: Within normal limits.  KIDNEYS/URETERS: Within normal limits.    BLADDER: Within normal limits.  REPRODUCTIVE ORGANS: Prostate gland is enlarged.    BOWEL: No bowel obstruction. Appendix is within normal limits. Diffuse colonic diverticulosis. Mild wall thickening of the duodenum adjacent to the inflamed pancreatic head.  PERITONEUM: Trace free fluid.  VESSELS: Atherosclerotic calcification.  RETROPERITONEUM/LYMPH NODES: No lymphadenopathy.  ABDOMINAL WALL: Small fat-containing left inguinal hernia. Small fat-containing umbilical hernia.  BONES: Mild degenerative changes in the spine.    IMPRESSION:    Findingsconsistent with pancreatitis involving the pancreatic head. A 3 x 1.4 cm complex cystic lesion in the uncinate process likely corresponds to the recently biopsied lesion. Fluid is noted tracking in the right anterior pararenal space to the pelvis.    Cholelithiasis.    Diffuse hepatic steatosis.    Mild enhancement of the wall of the common bile duct, raising consideration of cholangitis.    Indeterminate 0.6 cm right middle lobe lung nodule. Dedicated chest CT is recommended for further evaluation.      < end of copied text >

## 2020-11-24 NOTE — ED PROVIDER NOTE - ATTENDING CONTRIBUTION TO CARE
I performed a face to face evaluation of this patient and obtained a history and performed a full exam.  I agree with the history, physical exam and plan of the PA.  h/p by me

## 2020-11-24 NOTE — H&P ADULT - PROBLEM SELECTOR PLAN 2
No bowel movements in 2 days. Patient saying he is not passing gas either. Abd distended and patient feels bloated.  - CT a/p and Abd XR showing nonobstructive gas pattern  - Start bowel regimen: miralax, senna, lactulose No bowel movements in 2 days. Patient saying he is not passing gas either. Abd distended and patient feels bloated.  - CT a/p and Abd XR showing nonobstructive gas pattern  - Start bowel regimen: miralax, senna

## 2020-11-24 NOTE — H&P ADULT - NSHPPHYSICALEXAM_GEN_ALL_CORE
PHYSICAL EXAM:  GENERAL: NAD, well-groomed, well-developed  HEAD:  Atraumatic, Normocephalic  EYES: EOMI, PERRLA, conjunctiva and sclera clear  ENMT: No tonsillar erythema, exudates, or enlargement; Moist mucous membranes, Good dentition, No lesions  NECK: Supple, No JVD, Normal thyroid  CHEST/LUNG: Clear to percussion bilaterally; No rales, rhonchi, wheezing, or rubs  HEART: Regular rate and rhythm; No murmurs, rubs, or gallops  ABDOMEN: Distended, diffusely tender to palpation; Bowel sounds present  VASCULAR:  2+ Peripheral Pulses, No clubbing, cyanosis, or edema  LYMPH: No lymphadenopathy noted  SKIN: No rashes or lesions  NERVOUS SYSTEM:  Alert & Oriented X3, Good concentration; Motor Strength 5/5 B/L upper and lower extremities; DTRs 2+ intact and symmetric

## 2020-11-24 NOTE — CONSULT NOTE ADULT - PROVIDER SPECIALTY LIST ADULT
I have approved the Proventil inhaler, albuterol nebulizer duplicate therapy to have not duplicate therapy would duplicate therapy and will ask her to discuss this with Dr. Peraza upon her return   Gastroenterology

## 2020-11-24 NOTE — H&P ADULT - ASSESSMENT
70 M with HTN, DM2, BPH, diverticulosis presenting with abdominal pain. Found to have pancreatitis, likely iatrogenic, following his pancreatic biopsy the day prior.

## 2020-11-24 NOTE — CONSULT NOTE ADULT - ASSESSMENT
Impression:  1) Post EUS abdominal pain- Patient found to have leukocytosis and elevated lipase, likely developed pancreatitis post procedure  2) 2.5cm cystic lesion in pancreas likely IPMN      Recommendations:  - Aggressive IVF  - Analgesia  - NPO for now  - Monitor CBC and CMP   - Follow up pathology    Gi Apodaca, PGY6  Gastroenterology Fellow  Pager # [phone] / [phone]  Can be contacted via [employer] Teams

## 2020-11-24 NOTE — H&P ADULT - PROBLEM SELECTOR PLAN 6
Transitions of Care Status:  1.  Name of PCP: Dr Jaime Rossi [phone] (in [place])  2.  PCP Contacted on Admission: [ ] Y    [X ] N    3.  PCP contacted at Discharge: [ ] Y    [ ] N    [ ] N/A  4.  Post-Discharge Appointment Date and Location:  5.  Summary of Handoff given to PCP:

## 2020-11-24 NOTE — ED PROVIDER NOTE - CLINICAL SUMMARY MEDICAL DECISION MAKING FREE TEXT BOX
Patient presents to the ED with diffuse constant severe abd pain that started last night after having a pancreatic biopsy yesterday a/w nausea. Patient vs ok, nontoxic appearing, abd diffusely tender. Plan for labs, ct a/p, pain control, eval for diff including but not limited to bowel obstruction, perforation, pancreatitis, reass.

## 2020-11-24 NOTE — ED PROVIDER NOTE - OBJECTIVE STATEMENT
70m presents to the ED for abd pain. Patient had pancreatic biopsy yesterday, was ok on hospital dc, and shortly after, developed severe diffuse abd pain, constant, no other radiation, a/w nausea and dry  heaving. Denies fevers, chills, ha, cp, cough, sob, diarrhea, dysuria. Has not had a bm or passed gas since 2 d ago.

## 2020-11-24 NOTE — H&P ADULT - HISTORY OF PRESENT ILLNESS
70 year old male with hx of HTN, DM2, diverticulosis, BPH, presenting with abdominal pain after a pancreatic biopsy procedure yesterday.  Patient first developed abdominal pain in october and though it was his diverticulosis so he went to the hospital then. Imaging done at that time demonstrated a pancreatic cyst so he was scheduled for biopsy yesterday.   Patient received the procedure in the morning and did well after the procedure. But after he was discharged and went home, he started experiencing severe diffuse abdominal pain that radiates to the back. Patient also had nausea, but no vomiting. No BMs in several days and no passing of gas. Denies chest pain or SOB, recent fevers or chills.

## 2020-11-24 NOTE — H&P ADULT - PROBLEM SELECTOR PLAN 1
Moderately elevated lipase with CT a/p showing evidence of pancreatitis. Likely 2/2 to pancreatic biopsy done day prior. Also possible due to stones or triglycerides. Cholelithiasis seen but no cholecystitis or CBD dilatation, although mild enhancement of CBD suggests possible cholangitis.   - C/w IV hydration with LR at 150 cc/hr  - Pain control with tylenol prn for mild pain, dilaudid 1mg for moderate pain, 2mg for severe pain.   - Check serum triglycerides  - Monitor off antibiotics for now. But if starts spiking fevers, will get BCx and start abx for possible cholangitis  - clear liquid diet and advance as tolerated Moderately elevated lipase with CT a/p showing evidence of pancreatitis. Likely 2/2 to pancreatic biopsy done day prior. Also possible due to stones or triglycerides. Cholelithiasis seen but no cholecystitis or CBD dilatation, although mild enhancement of CBD suggests possible cholangitis.   - C/w IV hydration with LR at 150 cc/hr  - Pain control with tylenol prn for mild pain, dilaudid 1mg for moderate pain, 2mg for severe pain.   - Check serum triglycerides  - Monitor off antibiotics for now. But if starts spiking fevers, will get BCx and start abx for possible cholangitis  - NPO for now Moderately elevated lipase with CT a/p showing evidence of pancreatitis. Likely 2/2 to pancreatic biopsy done day prior. Also possible due to stones or triglycerides. Cholelithiasis seen but no cholecystitis or CBD dilatation, although mild enhancement of CBD suggests possible cholangitis.   - C/w IV hydration with LR at 150 cc/hr  - Pain control with tylenol prn for mild pain, dilaudid 1mg for moderate/severe pain.  - Check serum triglycerides  - Monitor off antibiotics for now. But if starts spiking fevers, will get BCx and start abx for possible cholangitis  - NPO for now

## 2020-11-25 LAB
ALBUMIN SERPL ELPH-MCNC: 3.3 G/DL — SIGNIFICANT CHANGE UP (ref 3.3–5)
ALP SERPL-CCNC: 51 U/L — SIGNIFICANT CHANGE UP (ref 40–120)
ALT FLD-CCNC: 19 U/L — SIGNIFICANT CHANGE UP (ref 4–41)
ANION GAP SERPL CALC-SCNC: 14 MMO/L — SIGNIFICANT CHANGE UP (ref 7–14)
AST SERPL-CCNC: 19 U/L — SIGNIFICANT CHANGE UP (ref 4–40)
BASOPHILS # BLD AUTO: 0.09 K/UL — SIGNIFICANT CHANGE UP (ref 0–0.2)
BASOPHILS NFR BLD AUTO: 0.4 % — SIGNIFICANT CHANGE UP (ref 0–2)
BILIRUB SERPL-MCNC: 0.7 MG/DL — SIGNIFICANT CHANGE UP (ref 0.2–1.2)
BUN SERPL-MCNC: 8 MG/DL — SIGNIFICANT CHANGE UP (ref 7–23)
CALCIUM SERPL-MCNC: 8.9 MG/DL — SIGNIFICANT CHANGE UP (ref 8.4–10.5)
CHLORIDE SERPL-SCNC: 98 MMOL/L — SIGNIFICANT CHANGE UP (ref 98–107)
CO2 SERPL-SCNC: 24 MMOL/L — SIGNIFICANT CHANGE UP (ref 22–31)
CREAT SERPL-MCNC: 0.72 MG/DL — SIGNIFICANT CHANGE UP (ref 0.5–1.3)
EOSINOPHIL # BLD AUTO: 0.02 K/UL — SIGNIFICANT CHANGE UP (ref 0–0.5)
EOSINOPHIL NFR BLD AUTO: 0.1 % — SIGNIFICANT CHANGE UP (ref 0–6)
GLUCOSE BLDC GLUCOMTR-MCNC: 123 MG/DL — HIGH (ref 70–99)
GLUCOSE BLDC GLUCOMTR-MCNC: 136 MG/DL — HIGH (ref 70–99)
GLUCOSE BLDC GLUCOMTR-MCNC: 155 MG/DL — HIGH (ref 70–99)
GLUCOSE SERPL-MCNC: 114 MG/DL — HIGH (ref 70–99)
HCT VFR BLD CALC: 46.1 % — SIGNIFICANT CHANGE UP (ref 39–50)
HCV AB S/CO SERPL IA: 0.08 S/CO — SIGNIFICANT CHANGE UP (ref 0–0.99)
HCV AB SERPL-IMP: SIGNIFICANT CHANGE UP
HGB BLD-MCNC: 15 G/DL — SIGNIFICANT CHANGE UP (ref 13–17)
IMM GRANULOCYTES NFR BLD AUTO: 1 % — SIGNIFICANT CHANGE UP (ref 0–1.5)
LYMPHOCYTES # BLD AUTO: 1.85 K/UL — SIGNIFICANT CHANGE UP (ref 1–3.3)
LYMPHOCYTES # BLD AUTO: 8.3 % — LOW (ref 13–44)
MAGNESIUM SERPL-MCNC: 1.7 MG/DL — SIGNIFICANT CHANGE UP (ref 1.6–2.6)
MCHC RBC-ENTMCNC: 29.9 PG — SIGNIFICANT CHANGE UP (ref 27–34)
MCHC RBC-ENTMCNC: 32.5 % — SIGNIFICANT CHANGE UP (ref 32–36)
MCV RBC AUTO: 92 FL — SIGNIFICANT CHANGE UP (ref 80–100)
MONOCYTES # BLD AUTO: 2.05 K/UL — HIGH (ref 0–0.9)
MONOCYTES NFR BLD AUTO: 9.2 % — SIGNIFICANT CHANGE UP (ref 2–14)
NEUTROPHILS # BLD AUTO: 18.14 K/UL — HIGH (ref 1.8–7.4)
NEUTROPHILS NFR BLD AUTO: 81 % — HIGH (ref 43–77)
NRBC # FLD: 0 K/UL — SIGNIFICANT CHANGE UP (ref 0–0)
PHOSPHATE SERPL-MCNC: 1.4 MG/DL — LOW (ref 2.5–4.5)
PLATELET # BLD AUTO: 228 K/UL — SIGNIFICANT CHANGE UP (ref 150–400)
PMV BLD: 9.2 FL — SIGNIFICANT CHANGE UP (ref 7–13)
POTASSIUM SERPL-MCNC: 3.7 MMOL/L — SIGNIFICANT CHANGE UP (ref 3.5–5.3)
POTASSIUM SERPL-SCNC: 3.7 MMOL/L — SIGNIFICANT CHANGE UP (ref 3.5–5.3)
PROT SERPL-MCNC: 6.3 G/DL — SIGNIFICANT CHANGE UP (ref 6–8.3)
RBC # BLD: 5.01 M/UL — SIGNIFICANT CHANGE UP (ref 4.2–5.8)
RBC # FLD: 13.7 % — SIGNIFICANT CHANGE UP (ref 10.3–14.5)
SODIUM SERPL-SCNC: 136 MMOL/L — SIGNIFICANT CHANGE UP (ref 135–145)
WBC # BLD: 22.38 K/UL — HIGH (ref 3.8–10.5)
WBC # FLD AUTO: 22.38 K/UL — HIGH (ref 3.8–10.5)

## 2020-11-25 PROCEDURE — 99233 SBSQ HOSP IP/OBS HIGH 50: CPT | Mod: GC

## 2020-11-25 PROCEDURE — 71045 X-RAY EXAM CHEST 1 VIEW: CPT | Mod: 26

## 2020-11-25 RX ORDER — DEXTROSE 50 % IN WATER 50 %
25 SYRINGE (ML) INTRAVENOUS ONCE
Refills: 0 | Status: DISCONTINUED | OUTPATIENT
Start: 2020-11-25 | End: 2020-11-28

## 2020-11-25 RX ORDER — INSULIN LISPRO 100/ML
VIAL (ML) SUBCUTANEOUS
Refills: 0 | Status: DISCONTINUED | OUTPATIENT
Start: 2020-11-25 | End: 2020-11-28

## 2020-11-25 RX ORDER — SODIUM CHLORIDE 9 MG/ML
1000 INJECTION, SOLUTION INTRAVENOUS
Refills: 0 | Status: DISCONTINUED | OUTPATIENT
Start: 2020-11-25 | End: 2020-11-28

## 2020-11-25 RX ORDER — GLUCAGON INJECTION, SOLUTION 0.5 MG/.1ML
1 INJECTION, SOLUTION SUBCUTANEOUS ONCE
Refills: 0 | Status: DISCONTINUED | OUTPATIENT
Start: 2020-11-25 | End: 2020-11-28

## 2020-11-25 RX ORDER — DEXTROSE 50 % IN WATER 50 %
12.5 SYRINGE (ML) INTRAVENOUS ONCE
Refills: 0 | Status: DISCONTINUED | OUTPATIENT
Start: 2020-11-25 | End: 2020-11-28

## 2020-11-25 RX ORDER — DEXTROSE 50 % IN WATER 50 %
15 SYRINGE (ML) INTRAVENOUS ONCE
Refills: 0 | Status: DISCONTINUED | OUTPATIENT
Start: 2020-11-25 | End: 2020-11-28

## 2020-11-25 RX ORDER — INSULIN LISPRO 100/ML
VIAL (ML) SUBCUTANEOUS AT BEDTIME
Refills: 0 | Status: DISCONTINUED | OUTPATIENT
Start: 2020-11-25 | End: 2020-11-28

## 2020-11-25 RX ADMIN — HEPARIN SODIUM 5000 UNIT(S): 5000 INJECTION INTRAVENOUS; SUBCUTANEOUS at 21:24

## 2020-11-25 RX ADMIN — TAMSULOSIN HYDROCHLORIDE 0.4 MILLIGRAM(S): 0.4 CAPSULE ORAL at 21:24

## 2020-11-25 RX ADMIN — OXYCODONE AND ACETAMINOPHEN 1 TABLET(S): 5; 325 TABLET ORAL at 05:55

## 2020-11-25 RX ADMIN — LISINOPRIL 10 MILLIGRAM(S): 2.5 TABLET ORAL at 05:50

## 2020-11-25 RX ADMIN — POLYETHYLENE GLYCOL 3350 17 GRAM(S): 17 POWDER, FOR SOLUTION ORAL at 10:45

## 2020-11-25 RX ADMIN — SODIUM CHLORIDE 150 MILLILITER(S): 9 INJECTION, SOLUTION INTRAVENOUS at 02:03

## 2020-11-25 RX ADMIN — HYDROMORPHONE HYDROCHLORIDE 1 MILLIGRAM(S): 2 INJECTION INTRAMUSCULAR; INTRAVENOUS; SUBCUTANEOUS at 10:42

## 2020-11-25 RX ADMIN — HYDROMORPHONE HYDROCHLORIDE 1 MILLIGRAM(S): 2 INJECTION INTRAMUSCULAR; INTRAVENOUS; SUBCUTANEOUS at 17:33

## 2020-11-25 RX ADMIN — Medication 1: at 17:45

## 2020-11-25 RX ADMIN — OXYCODONE AND ACETAMINOPHEN 1 TABLET(S): 5; 325 TABLET ORAL at 06:38

## 2020-11-25 RX ADMIN — HYDROMORPHONE HYDROCHLORIDE 1 MILLIGRAM(S): 2 INJECTION INTRAMUSCULAR; INTRAVENOUS; SUBCUTANEOUS at 10:56

## 2020-11-25 RX ADMIN — HYDROMORPHONE HYDROCHLORIDE 1 MILLIGRAM(S): 2 INJECTION INTRAMUSCULAR; INTRAVENOUS; SUBCUTANEOUS at 17:19

## 2020-11-25 RX ADMIN — SENNA PLUS 2 TABLET(S): 8.6 TABLET ORAL at 21:24

## 2020-11-25 NOTE — PROGRESS NOTE ADULT - PROBLEM SELECTOR PLAN 6
Transitions of Care Status:  1.  Name of PCP: Dr Jaime Rossi [phone] (in [place])  2.  PCP Contacted on Admission: [ ] Y    [X ] N    3.  PCP contacted at Discharge: [ ] Y    [ ] N    [ ] N/A  4.  Post-Discharge Appointment Date and Location:  5.  Summary of Handoff given to PCP:

## 2020-11-25 NOTE — PROGRESS NOTE ADULT - PROBLEM SELECTOR PLAN 2
No bowel movements in 2 days. Patient saying he is not passing gas either. Abd distended and patient feels bloated.  - CT a/p and Abd XR showing nonobstructive gas pattern  - Start bowel regimen: miralax, senna

## 2020-11-25 NOTE — PROGRESS NOTE ADULT - PROBLEM SELECTOR PLAN 1
Moderately elevated lipase with CT a/p showing evidence of pancreatitis. Likely 2/2 to pancreatic biopsy done day prior. Also possible due to stones or triglycerides. Cholelithiasis seen but no cholecystitis or CBD dilatation, although mild enhancement of CBD suggests possible cholangitis.   - C/w IV hydration with LR at 150 cc/hr  - Pain control with tylenol prn for mild pain, dilaudid 1mg for moderate/severe pain.  - Check serum triglycerides  - Monitor off antibiotics for now. But if starts spiking fevers, will get BCx and start abx for possible cholangitis  - NPO for now

## 2020-11-25 NOTE — PROGRESS NOTE ADULT - SUBJECTIVE AND OBJECTIVE BOX
PROGRESS NOTE:   Authored by Jong Landry MD  PGY-1, Internal Medicine  Pager [facility] [phone], J 77584     Patient is a 70y old  Male who presents with a chief complaint of pancreatitis (24 Nov 2020 17:02)      SUBJECTIVE / OVERNIGHT EVENTS: No events overnight.    ADDITIONAL REVIEW OF SYSTEMS: Denies fevers, chills, n/v.    MEDICATIONS  (STANDING):  dextrose 40% Gel 15 Gram(s) Oral once  dextrose 5%. 1000 milliLiter(s) (50 mL/Hr) IV Continuous <Continuous>  dextrose 5%. 1000 milliLiter(s) (100 mL/Hr) IV Continuous <Continuous>  dextrose 50% Injectable 25 Gram(s) IV Push once  dextrose 50% Injectable 12.5 Gram(s) IV Push once  dextrose 50% Injectable 25 Gram(s) IV Push once  glucagon  Injectable 1 milliGRAM(s) IntraMuscular once  heparin   Injectable 5000 Unit(s) SubCutaneous every 8 hours  insulin lispro (ADMELOG) corrective regimen sliding scale   SubCutaneous every 6 hours  lactated ringers. 1000 milliLiter(s) (150 mL/Hr) IV Continuous <Continuous>  lisinopril 10 milliGRAM(s) Oral daily  polyethylene glycol 3350 17 Gram(s) Oral daily  senna 2 Tablet(s) Oral at bedtime  tamsulosin 0.4 milliGRAM(s) Oral at bedtime    MEDICATIONS  (PRN):  acetaminophen   Tablet .. 650 milliGRAM(s) Oral every 6 hours PRN Mild Pain (1 - 3)  HYDROmorphone  Injectable 1 milliGRAM(s) IV Push every 6 hours PRN Severe Pain (7 - 10)  oxycodone    5 mG/acetaminophen 325 mG 1 Tablet(s) Oral every 4 hours PRN Moderate Pain (4 - 6)      CAPILLARY BLOOD GLUCOSE      POCT Blood Glucose.: 136 mg/dL (25 Nov 2020 05:49)  POCT Blood Glucose.: 127 mg/dL (24 Nov 2020 23:53)  POCT Blood Glucose.: 140 mg/dL (24 Nov 2020 19:04)  POCT Blood Glucose.: 187 mg/dL (24 Nov 2020 09:08)    I&O's Summary      PHYSICAL EXAM:  Vital Signs Last 24 Hrs  T(C): 36.7 (25 Nov 2020 05:27), Max: 37.2 (24 Nov 2020 19:50)  T(F): 98 (25 Nov 2020 05:27), Max: 99 (24 Nov 2020 19:50)  HR: 89 (25 Nov 2020 05:27) (60 - 89)  BP: 146/69 (25 Nov 2020 05:27) (142/73 - 179/81)  BP(mean): --  RR: 18 (25 Nov 2020 05:27) (16 - 18)  SpO2: 99% (25 Nov 2020 05:27) (96% - 99%)    CONSTITUTIONAL: NAD, lying in bed comfortably  RESPIRATORY: Normal respiratory effort; CTABL  CARDIOVASCULAR: Regular rate and rhythm, normal S1 and S2, no murmur/rub/gallop  ABDOMEN: Soft, nondistended, nontender to palpation, normoactive bowel sounds, no rebound/guarding  MUSCLOSKELETAL: no joint swelling or tenderness to palpation, FROM all extremities  NEURO: AAOx3 to person, place, and time, full and equal strength all extremities   EXTREMITIES: no pedal edema    LABS:                        16.2   17.99 )-----------( 266      ( 24 Nov 2020 09:55 )             48.6     11-24    135  |  97<L>  |  10  ----------------------------<  177<H>  4.0   |  28  |  0.77    Ca    9.9      24 Nov 2020 09:55    TPro  7.6  /  Alb  4.4  /  TBili  0.7  /  DBili  x   /  AST  19  /  ALT  28  /  AlkPhos  55  11-24    PT/INR - ( 24 Nov 2020 09:55 )   PT: 13.2 SEC;   INR: 1.16          PTT - ( 24 Nov 2020 09:55 )  PTT:30.8 SEC            RADIOLOGY & ADDITIONAL TESTS:

## 2020-11-25 NOTE — PROGRESS NOTE ADULT - PROBLEM SELECTOR PLAN 5
DVT ppx: heparin subq  Diet: NPO for now, advance as tolerated  Dispo: likely home pending clinical improvement

## 2020-11-25 NOTE — PROGRESS NOTE ADULT - ASSESSMENT
Impression:  1) Post EUS abdominal pain- Patient found to have leukocytosis and elevated lipase, likely developed pancreatitis post procedure  2) 2.5cm cystic lesion in pancreas likely IPMN      Recommendations:  - Aggressive IVF  - Analgesia  - Clear liquid diet today, if patient's symptoms are improving (low fat diet)  - Monitor CBC and CMP   - Follow up pathology    Gi Apodaca, PGY6  Gastroenterology Fellow  Pager # [phone] / [phone]  Can be contacted via Microsoft Teams

## 2020-11-25 NOTE — PROGRESS NOTE ADULT - SUBJECTIVE AND OBJECTIVE BOX
Chief Complaint:  Patient is a 70y old  Male who presents with a chief complaint of pancreatitis (25 Nov 2020 07:14)      Interval Events: No acute overnight events. Pt denies nausea, vomiting, abd pain.   ROS: All 12 point system except listed above were otherwise negative.    Allergies:  No Known Allergies        Hospital Medications:  acetaminophen   Tablet .. 650 milliGRAM(s) Oral every 6 hours PRN  dextrose 40% Gel 15 Gram(s) Oral once  dextrose 5%. 1000 milliLiter(s) IV Continuous <Continuous>  dextrose 5%. 1000 milliLiter(s) IV Continuous <Continuous>  dextrose 50% Injectable 25 Gram(s) IV Push once  dextrose 50% Injectable 12.5 Gram(s) IV Push once  dextrose 50% Injectable 25 Gram(s) IV Push once  glucagon  Injectable 1 milliGRAM(s) IntraMuscular once  heparin   Injectable 5000 Unit(s) SubCutaneous every 8 hours  HYDROmorphone  Injectable 1 milliGRAM(s) IV Push every 6 hours PRN  insulin lispro (ADMELOG) corrective regimen sliding scale   SubCutaneous every 6 hours  lactated ringers. 1000 milliLiter(s) IV Continuous <Continuous>  lisinopril 10 milliGRAM(s) Oral daily  oxycodone    5 mG/acetaminophen 325 mG 1 Tablet(s) Oral every 4 hours PRN  polyethylene glycol 3350 17 Gram(s) Oral daily  senna 2 Tablet(s) Oral at bedtime  tamsulosin 0.4 milliGRAM(s) Oral at bedtime      PMHX/PSHX:  INGRID on CPAP    BPH (benign prostatic hyperplasia)    Diverticulitis    Type 2 diabetes mellitus    HTN (hypertension)    H/O inguinal hernia repair        Family history:    There is no family history of peptic ulcer disease, gastric cancer, colon polyps, colon cancer, celiac disease, biliary, hepatic, or pancreatic disease.  None of the female relatives have breast, uterine, or ovarian cancer.     PHYSICAL EXAM:   Vital Signs:  Vital Signs Last 24 Hrs  T(C): 36.7 (25 Nov 2020 05:27), Max: 37.2 (24 Nov 2020 19:50)  T(F): 98 (25 Nov 2020 05:27), Max: 99 (24 Nov 2020 19:50)  HR: 89 (25 Nov 2020 05:27) (72 - 89)  BP: 146/69 (25 Nov 2020 05:27) (142/73 - 179/81)  BP(mean): --  RR: 18 (25 Nov 2020 05:27) (16 - 18)  SpO2: 99% (25 Nov 2020 05:27) (96% - 99%)  Daily Height in cm: 185.42 (24 Nov 2020 19:50)    Daily     GENERAL:  Appears stated age, well-groomed, well-nourished, no distress  HEENT:  NC/AT,  conjunctivae clear and pink, sclera -anicteric  CHEST:  Full & symmetric excursion, no increased effort, breath sounds clear  HEART:  Regular rhythm, S1, S2, no murmur/rub/S3/S4, no abdominal bruit, no edema  ABDOMEN:  Soft, non-tender, non-distended, normoactive bowel sounds,  no masses ,no hepato-splenomegaly, no signs of chronic liver disease  EXTREMITIES:  no cyanosis,clubbing or edema  SKIN:  No rash/erythema/ecchymoses/petechiae/wounds/abscess/warm/dry  NEURO:  Alert, oriented, no asterixis, no tremor, no encephalopathy  PSYCH: Alert and oriented    LABS:                        15.0   22.38 )-----------( 228      ( 25 Nov 2020 06:12 )             46.1     Mean Cell Volume: 92.0 fL (11-25-20 @ 06:12)    11-25    136  |  98  |  8   ----------------------------<  114<H>  3.7   |  24  |  0.72    Ca    8.9      25 Nov 2020 06:12  Phos  1.4     11-25  Mg     1.7     11-25    TPro  6.3  /  Alb  3.3  /  TBili  0.7  /  DBili  x   /  AST  19  /  ALT  19  /  AlkPhos  51  11-25    LIVER FUNCTIONS - ( 25 Nov 2020 06:12 )  Alb: 3.3 g/dL / Pro: 6.3 g/dL / ALK PHOS: 51 u/L / ALT: 19 u/L / AST: 19 u/L / GGT: x           PT/INR - ( 24 Nov 2020 09:55 )   PT: 13.2 SEC;   INR: 1.16          PTT - ( 24 Nov 2020 09:55 )  PTT:30.8 SEC    Amylase Serum--      Lipase [id number]       Ammonia--                          15.0   22.38 )-----------( 228      ( 25 Nov 2020 06:12 )             46.1                         16.2   17.99 )-----------( 266      ( 24 Nov 2020 09:55 )             48.6     Imaging:             Chief Complaint:  Patient is a 70y old  Male who presents with a chief complaint of pancreatitis (25 Nov 2020 07:14)      Interval Events: No acute overnight events. Pt denies nausea, vomiting, abd pain.   ROS: All 12 point system except listed above were otherwise negative.    Allergies:  No Known Allergies        Hospital Medications:  acetaminophen   Tablet .. 650 milliGRAM(s) Oral every 6 hours PRN  dextrose 40% Gel 15 Gram(s) Oral once  dextrose 5%. 1000 milliLiter(s) IV Continuous <Continuous>  dextrose 5%. 1000 milliLiter(s) IV Continuous <Continuous>  dextrose 50% Injectable 25 Gram(s) IV Push once  dextrose 50% Injectable 12.5 Gram(s) IV Push once  dextrose 50% Injectable 25 Gram(s) IV Push once  glucagon  Injectable 1 milliGRAM(s) IntraMuscular once  heparin   Injectable 5000 Unit(s) SubCutaneous every 8 hours  HYDROmorphone  Injectable 1 milliGRAM(s) IV Push every 6 hours PRN  insulin lispro (ADMELOG) corrective regimen sliding scale   SubCutaneous every 6 hours  lactated ringers. 1000 milliLiter(s) IV Continuous <Continuous>  lisinopril 10 milliGRAM(s) Oral daily  oxycodone    5 mG/acetaminophen 325 mG 1 Tablet(s) Oral every 4 hours PRN  polyethylene glycol 3350 17 Gram(s) Oral daily  senna 2 Tablet(s) Oral at bedtime  tamsulosin 0.4 milliGRAM(s) Oral at bedtime      PMHX/PSHX:  INGRID on CPAP    BPH (benign prostatic hyperplasia)    Diverticulitis    Type 2 diabetes mellitus    HTN (hypertension)    H/O inguinal hernia repair        Family history:    There is no family history of peptic ulcer disease, gastric cancer, colon polyps, colon cancer, celiac disease, biliary, hepatic, or pancreatic disease.  None of the female relatives have breast, uterine, or ovarian cancer.     PHYSICAL EXAM:   Vital Signs:  Vital Signs Last 24 Hrs  T(C): 36.7 (25 Nov 2020 05:27), Max: 37.2 (24 Nov 2020 19:50)  T(F): 98 (25 Nov 2020 05:27), Max: 99 (24 Nov 2020 19:50)  HR: 89 (25 Nov 2020 05:27) (72 - 89)  BP: 146/69 (25 Nov 2020 05:27) (142/73 - 179/81)  BP(mean): --  RR: 18 (25 Nov 2020 05:27) (16 - 18)  SpO2: 99% (25 Nov 2020 05:27) (96% - 99%)  Daily Height in cm: 185.42 (24 Nov 2020 19:50)    Daily     PHYSICAL EXAM:     GENERAL:  Appears stated age, well-groomed  HEENT:  NC/AT,  conjunctivae clear and pink, no thyromegaly  CHEST:  Full & symmetric excursion, no increased effort, breath sounds clear  HEART:  Regular rhythm, S1, S2, no murmur/rub/S3/S4  ABDOMEN:  Soft, TTP throughout, non-distended, normoactive bowel sounds  EXTEREMITIES:  no cyanosis,clubbing or edema  SKIN:  No rash/erythema/ecchymoses  NEURO:  Alert, oriented, no asterixis    LABS:                        15.0   22.38 )-----------( 228      ( 25 Nov 2020 06:12 )             46.1     Mean Cell Volume: 92.0 fL (11-25-20 @ 06:12)    11-25    136  |  98  |  8   ----------------------------<  114<H>  3.7   |  24  |  0.72    Ca    8.9      25 Nov 2020 06:12  Phos  1.4     11-25  Mg     1.7     11-25    TPro  6.3  /  Alb  3.3  /  TBili  0.7  /  DBili  x   /  AST  19  /  ALT  19  /  AlkPhos  51  11-25    LIVER FUNCTIONS - ( 25 Nov 2020 06:12 )  Alb: 3.3 g/dL / Pro: 6.3 g/dL / ALK PHOS: 51 u/L / ALT: 19 u/L / AST: 19 u/L / GGT: x           PT/INR - ( 24 Nov 2020 09:55 )   PT: 13.2 SEC;   INR: 1.16          PTT - ( 24 Nov 2020 09:55 )  PTT:30.8 SEC    Amylase Serum--      Lipase [id number]       Ammonia--                          15.0   22.38 )-----------( 228      ( 25 Nov 2020 06:12 )             46.1                         16.2   17.99 )-----------( 266      ( 24 Nov 2020 09:55 )             48.6     Imaging:

## 2020-11-26 LAB
ALBUMIN SERPL ELPH-MCNC: 3.4 G/DL — SIGNIFICANT CHANGE UP (ref 3.3–5)
ALP SERPL-CCNC: 51 U/L — SIGNIFICANT CHANGE UP (ref 40–120)
ALT FLD-CCNC: 13 U/L — SIGNIFICANT CHANGE UP (ref 4–41)
ANION GAP SERPL CALC-SCNC: 10 MMO/L — SIGNIFICANT CHANGE UP (ref 7–14)
AST SERPL-CCNC: 15 U/L — SIGNIFICANT CHANGE UP (ref 4–40)
BASOPHILS # BLD AUTO: 0.1 K/UL — SIGNIFICANT CHANGE UP (ref 0–0.2)
BASOPHILS NFR BLD AUTO: 0.4 % — SIGNIFICANT CHANGE UP (ref 0–2)
BILIRUB SERPL-MCNC: 0.7 MG/DL — SIGNIFICANT CHANGE UP (ref 0.2–1.2)
BUN SERPL-MCNC: 9 MG/DL — SIGNIFICANT CHANGE UP (ref 7–23)
CALCIUM SERPL-MCNC: 9.1 MG/DL — SIGNIFICANT CHANGE UP (ref 8.4–10.5)
CHLORIDE SERPL-SCNC: 96 MMOL/L — LOW (ref 98–107)
CO2 SERPL-SCNC: 26 MMOL/L — SIGNIFICANT CHANGE UP (ref 22–31)
CREAT SERPL-MCNC: 0.69 MG/DL — SIGNIFICANT CHANGE UP (ref 0.5–1.3)
EOSINOPHIL # BLD AUTO: 0.07 K/UL — SIGNIFICANT CHANGE UP (ref 0–0.5)
EOSINOPHIL NFR BLD AUTO: 0.3 % — SIGNIFICANT CHANGE UP (ref 0–6)
GLUCOSE BLDC GLUCOMTR-MCNC: 131 MG/DL — HIGH (ref 70–99)
GLUCOSE BLDC GLUCOMTR-MCNC: 134 MG/DL — HIGH (ref 70–99)
GLUCOSE BLDC GLUCOMTR-MCNC: 161 MG/DL — HIGH (ref 70–99)
GLUCOSE BLDC GLUCOMTR-MCNC: 169 MG/DL — HIGH (ref 70–99)
GLUCOSE SERPL-MCNC: 138 MG/DL — HIGH (ref 70–99)
HCT VFR BLD CALC: 42.6 % — SIGNIFICANT CHANGE UP (ref 39–50)
HGB BLD-MCNC: 14.1 G/DL — SIGNIFICANT CHANGE UP (ref 13–17)
IMM GRANULOCYTES NFR BLD AUTO: 1.4 % — SIGNIFICANT CHANGE UP (ref 0–1.5)
LYMPHOCYTES # BLD AUTO: 1.99 K/UL — SIGNIFICANT CHANGE UP (ref 1–3.3)
LYMPHOCYTES # BLD AUTO: 8.3 % — LOW (ref 13–44)
MAGNESIUM SERPL-MCNC: 1.9 MG/DL — SIGNIFICANT CHANGE UP (ref 1.6–2.6)
MCHC RBC-ENTMCNC: 30.1 PG — SIGNIFICANT CHANGE UP (ref 27–34)
MCHC RBC-ENTMCNC: 33.1 % — SIGNIFICANT CHANGE UP (ref 32–36)
MCV RBC AUTO: 91 FL — SIGNIFICANT CHANGE UP (ref 80–100)
MONOCYTES # BLD AUTO: 2.3 K/UL — HIGH (ref 0–0.9)
MONOCYTES NFR BLD AUTO: 9.6 % — SIGNIFICANT CHANGE UP (ref 2–14)
NEUTROPHILS # BLD AUTO: 19.16 K/UL — HIGH (ref 1.8–7.4)
NEUTROPHILS NFR BLD AUTO: 80 % — HIGH (ref 43–77)
NRBC # FLD: 0 K/UL — SIGNIFICANT CHANGE UP (ref 0–0)
PHOSPHATE SERPL-MCNC: 1.4 MG/DL — LOW (ref 2.5–4.5)
PLATELET # BLD AUTO: 234 K/UL — SIGNIFICANT CHANGE UP (ref 150–400)
PMV BLD: 9.2 FL — SIGNIFICANT CHANGE UP (ref 7–13)
POTASSIUM SERPL-MCNC: 3.4 MMOL/L — LOW (ref 3.5–5.3)
POTASSIUM SERPL-SCNC: 3.4 MMOL/L — LOW (ref 3.5–5.3)
PROT SERPL-MCNC: 6.6 G/DL — SIGNIFICANT CHANGE UP (ref 6–8.3)
RBC # BLD: 4.68 M/UL — SIGNIFICANT CHANGE UP (ref 4.2–5.8)
RBC # FLD: 13.3 % — SIGNIFICANT CHANGE UP (ref 10.3–14.5)
SODIUM SERPL-SCNC: 132 MMOL/L — LOW (ref 135–145)
WBC # BLD: 23.95 K/UL — HIGH (ref 3.8–10.5)
WBC # FLD AUTO: 23.95 K/UL — HIGH (ref 3.8–10.5)

## 2020-11-26 PROCEDURE — 99233 SBSQ HOSP IP/OBS HIGH 50: CPT | Mod: GC

## 2020-11-26 RX ORDER — SODIUM CHLORIDE 9 MG/ML
1000 INJECTION INTRAMUSCULAR; INTRAVENOUS; SUBCUTANEOUS
Refills: 0 | Status: DISCONTINUED | OUTPATIENT
Start: 2020-11-26 | End: 2020-11-28

## 2020-11-26 RX ORDER — ENOXAPARIN SODIUM 100 MG/ML
40 INJECTION SUBCUTANEOUS DAILY
Refills: 0 | Status: DISCONTINUED | OUTPATIENT
Start: 2020-11-26 | End: 2020-11-28

## 2020-11-26 RX ORDER — POTASSIUM PHOSPHATE, MONOBASIC POTASSIUM PHOSPHATE, DIBASIC 236; 224 MG/ML; MG/ML
15 INJECTION, SOLUTION INTRAVENOUS ONCE
Refills: 0 | Status: COMPLETED | OUTPATIENT
Start: 2020-11-26 | End: 2020-11-26

## 2020-11-26 RX ADMIN — HYDROMORPHONE HYDROCHLORIDE 1 MILLIGRAM(S): 2 INJECTION INTRAMUSCULAR; INTRAVENOUS; SUBCUTANEOUS at 21:20

## 2020-11-26 RX ADMIN — SODIUM CHLORIDE 150 MILLILITER(S): 9 INJECTION INTRAMUSCULAR; INTRAVENOUS; SUBCUTANEOUS at 13:09

## 2020-11-26 RX ADMIN — LISINOPRIL 10 MILLIGRAM(S): 2.5 TABLET ORAL at 05:16

## 2020-11-26 RX ADMIN — POLYETHYLENE GLYCOL 3350 17 GRAM(S): 17 POWDER, FOR SOLUTION ORAL at 11:12

## 2020-11-26 RX ADMIN — TAMSULOSIN HYDROCHLORIDE 0.4 MILLIGRAM(S): 0.4 CAPSULE ORAL at 21:21

## 2020-11-26 RX ADMIN — Medication 1: at 08:49

## 2020-11-26 RX ADMIN — POTASSIUM PHOSPHATE, MONOBASIC POTASSIUM PHOSPHATE, DIBASIC 62.5 MILLIMOLE(S): 236; 224 INJECTION, SOLUTION INTRAVENOUS at 10:39

## 2020-11-26 RX ADMIN — HYDROMORPHONE HYDROCHLORIDE 1 MILLIGRAM(S): 2 INJECTION INTRAMUSCULAR; INTRAVENOUS; SUBCUTANEOUS at 08:49

## 2020-11-26 RX ADMIN — HEPARIN SODIUM 5000 UNIT(S): 5000 INJECTION INTRAVENOUS; SUBCUTANEOUS at 05:16

## 2020-11-26 RX ADMIN — HYDROMORPHONE HYDROCHLORIDE 1 MILLIGRAM(S): 2 INJECTION INTRAMUSCULAR; INTRAVENOUS; SUBCUTANEOUS at 09:05

## 2020-11-26 RX ADMIN — Medication 1: at 17:41

## 2020-11-26 RX ADMIN — SENNA PLUS 2 TABLET(S): 8.6 TABLET ORAL at 21:21

## 2020-11-26 RX ADMIN — SODIUM CHLORIDE 150 MILLILITER(S): 9 INJECTION INTRAMUSCULAR; INTRAVENOUS; SUBCUTANEOUS at 21:20

## 2020-11-26 RX ADMIN — HYDROMORPHONE HYDROCHLORIDE 1 MILLIGRAM(S): 2 INJECTION INTRAMUSCULAR; INTRAVENOUS; SUBCUTANEOUS at 02:45

## 2020-11-26 RX ADMIN — Medication 10 MILLIGRAM(S): at 13:09

## 2020-11-26 RX ADMIN — HYDROMORPHONE HYDROCHLORIDE 1 MILLIGRAM(S): 2 INJECTION INTRAMUSCULAR; INTRAVENOUS; SUBCUTANEOUS at 02:30

## 2020-11-26 RX ADMIN — ENOXAPARIN SODIUM 40 MILLIGRAM(S): 100 INJECTION SUBCUTANEOUS at 11:12

## 2020-11-26 RX ADMIN — HYDROMORPHONE HYDROCHLORIDE 1 MILLIGRAM(S): 2 INJECTION INTRAMUSCULAR; INTRAVENOUS; SUBCUTANEOUS at 21:35

## 2020-11-26 NOTE — PROGRESS NOTE ADULT - PROBLEM SELECTOR PLAN 2
No bowel movements in 2 days. Patient saying he is not passing gas either. Abd distended and patient feels bloated.  - CT a/p and Abd XR showing nonobstructive gas pattern  - aggressive bowel regimen: miralax, senna No bowel movements in 2 days. Patient saying he is not passing gas either. Abd distended and patient feels bloated.  - CT a/p and Abd XR showing nonobstructive gas pattern  - aggressive bowel regimen: miralax, senna  - Will start dulcolax suppository

## 2020-11-26 NOTE — PROGRESS NOTE ADULT - PROBLEM SELECTOR PLAN 1
Moderately elevated lipase with CT a/p showing evidence of pancreatitis. Likely 2/2 to pancreatic biopsy done day prior. Cholelithiasis seen but no cholecystitis or CBD dilatation, although mild enhancement of CBD suggests possible cholangitis.   - C/w IV hydration with LR at 150 cc/hr  - Pain control with tylenol prn for mild pain, oxycodone 5 for moderate pain, dilaudid 1mg for severe pain.  - Triglycerides wnl  - Monitor off antibiotics for now. But if starts spiking fevers, will get BCx and start abx for possible cholangitis  - clear liquid diet, will advance to low fat Moderately elevated lipase with CT a/p showing evidence of pancreatitis. Likely 2/2 to pancreatic biopsy done day prior. Cholelithiasis seen but no cholecystitis or CBD dilatation, although mild enhancement of CBD suggests possible cholangitis.   - C/w IV hydration with NS at 150 cc/hr  - Pain control with tylenol prn for mild pain, oxycodone 5 for moderate pain, dilaudid 1mg for severe pain.  - Triglycerides wnl  - Monitor off antibiotics for now. But if starts spiking fevers, will get BCx and start abx for possible cholangitis  - clear liquid diet

## 2020-11-26 NOTE — PROGRESS NOTE ADULT - SUBJECTIVE AND OBJECTIVE BOX
PROGRESS NOTE:   Authored by Jong Landry MD  PGY-1, Internal Medicine  Pager [facility] [phone], J 41081     Patient is a 70y old  Male who presents with a chief complaint of pancreatitis (25 Nov 2020 09:23)      SUBJECTIVE / OVERNIGHT EVENTS: No events overnight.    ADDITIONAL REVIEW OF SYSTEMS: Denies fevers, chills, n/v.    MEDICATIONS  (STANDING):  dextrose 40% Gel 15 Gram(s) Oral once  dextrose 5%. 1000 milliLiter(s) (50 mL/Hr) IV Continuous <Continuous>  dextrose 5%. 1000 milliLiter(s) (100 mL/Hr) IV Continuous <Continuous>  dextrose 50% Injectable 25 Gram(s) IV Push once  dextrose 50% Injectable 12.5 Gram(s) IV Push once  dextrose 50% Injectable 25 Gram(s) IV Push once  glucagon  Injectable 1 milliGRAM(s) IntraMuscular once  heparin   Injectable 5000 Unit(s) SubCutaneous every 8 hours  insulin lispro (ADMELOG) corrective regimen sliding scale   SubCutaneous three times a day before meals  insulin lispro (ADMELOG) corrective regimen sliding scale   SubCutaneous at bedtime  lactated ringers. 1000 milliLiter(s) (150 mL/Hr) IV Continuous <Continuous>  lisinopril 10 milliGRAM(s) Oral daily  polyethylene glycol 3350 17 Gram(s) Oral daily  senna 2 Tablet(s) Oral at bedtime  tamsulosin 0.4 milliGRAM(s) Oral at bedtime    MEDICATIONS  (PRN):  acetaminophen   Tablet .. 650 milliGRAM(s) Oral every 6 hours PRN Mild Pain (1 - 3)  HYDROmorphone  Injectable 1 milliGRAM(s) IV Push every 6 hours PRN Severe Pain (7 - 10)  oxycodone    5 mG/acetaminophen 325 mG 1 Tablet(s) Oral every 4 hours PRN Moderate Pain (4 - 6)      CAPILLARY BLOOD GLUCOSE      POCT Blood Glucose.: 136 mg/dL (25 Nov 2020 21:23)  POCT Blood Glucose.: 155 mg/dL (25 Nov 2020 17:28)  POCT Blood Glucose.: 123 mg/dL (25 Nov 2020 12:24)    I&O's Summary    25 Nov 2020 07:01  -  26 Nov 2020 07:00  --------------------------------------------------------  IN: 354 mL / OUT: 1400 mL / NET: -1046 mL        PHYSICAL EXAM:  Vital Signs Last 24 Hrs  T(C): 37.2 (26 Nov 2020 05:14), Max: 37.2 (26 Nov 2020 05:14)  T(F): 99 (26 Nov 2020 05:14), Max: 99 (26 Nov 2020 05:14)  HR: 70 (26 Nov 2020 05:14) (70 - 83)  BP: 152/79 (26 Nov 2020 05:14) (131/62 - 160/81)  BP(mean): --  RR: 18 (26 Nov 2020 05:14) (18 - 20)  SpO2: 95% (26 Nov 2020 05:14) (94% - 96%)    CONSTITUTIONAL: NAD, lying in bed comfortably  RESPIRATORY: Normal respiratory effort; CTABL  CARDIOVASCULAR: Regular rate and rhythm, normal S1 and S2, no murmur/rub/gallop  ABDOMEN: Soft, nondistended, nontender to palpation, normoactive bowel sounds, no rebound/guarding  MUSCLOSKELETAL: no joint swelling or tenderness to palpation, FROM all extremities  NEURO: AAOx3 to person, place, and time, full and equal strength all extremities   EXTREMITIES: no pedal edema    LABS:                        15.0   22.38 )-----------( 228      ( 25 Nov 2020 06:12 )             46.1     11-25    136  |  98  |  8   ----------------------------<  114<H>  3.7   |  24  |  0.72    Ca    8.9      25 Nov 2020 06:12  Phos  1.4     11-25  Mg     1.7     11-25    TPro  6.3  /  Alb  3.3  /  TBili  0.7  /  DBili  x   /  AST  19  /  ALT  19  /  AlkPhos  51  11-25    PT/INR - ( 24 Nov 2020 09:55 )   PT: 13.2 SEC;   INR: 1.16          PTT - ( 24 Nov 2020 09:55 )  PTT:30.8 SEC          Culture - Blood (collected 24 Nov 2020 21:48)  Source: .Blood Blood-Venous  Preliminary Report (25 Nov 2020 22:01):    No growth to date.    Culture - Blood (collected 24 Nov 2020 21:48)  Source: .Blood Blood-Peripheral  Preliminary Report (25 Nov 2020 22:01):    No growth to date.        RADIOLOGY & ADDITIONAL TESTS:

## 2020-11-27 LAB
ALBUMIN SERPL ELPH-MCNC: 2.9 G/DL — LOW (ref 3.3–5)
ALP SERPL-CCNC: 49 U/L — SIGNIFICANT CHANGE UP (ref 40–120)
ALT FLD-CCNC: 13 U/L — SIGNIFICANT CHANGE UP (ref 4–41)
ANION GAP SERPL CALC-SCNC: 11 MMO/L — SIGNIFICANT CHANGE UP (ref 7–14)
AST SERPL-CCNC: 14 U/L — SIGNIFICANT CHANGE UP (ref 4–40)
BILIRUB SERPL-MCNC: 0.6 MG/DL — SIGNIFICANT CHANGE UP (ref 0.2–1.2)
BUN SERPL-MCNC: 9 MG/DL — SIGNIFICANT CHANGE UP (ref 7–23)
CALCIUM SERPL-MCNC: 8.5 MG/DL — SIGNIFICANT CHANGE UP (ref 8.4–10.5)
CHLORIDE SERPL-SCNC: 98 MMOL/L — SIGNIFICANT CHANGE UP (ref 98–107)
CO2 SERPL-SCNC: 24 MMOL/L — SIGNIFICANT CHANGE UP (ref 22–31)
CREAT SERPL-MCNC: 0.69 MG/DL — SIGNIFICANT CHANGE UP (ref 0.5–1.3)
GLUCOSE BLDC GLUCOMTR-MCNC: 124 MG/DL — HIGH (ref 70–99)
GLUCOSE BLDC GLUCOMTR-MCNC: 135 MG/DL — HIGH (ref 70–99)
GLUCOSE BLDC GLUCOMTR-MCNC: 142 MG/DL — HIGH (ref 70–99)
GLUCOSE BLDC GLUCOMTR-MCNC: 156 MG/DL — HIGH (ref 70–99)
GLUCOSE SERPL-MCNC: 132 MG/DL — HIGH (ref 70–99)
HCT VFR BLD CALC: 38.3 % — LOW (ref 39–50)
HGB BLD-MCNC: 12.8 G/DL — LOW (ref 13–17)
MAGNESIUM SERPL-MCNC: 2 MG/DL — SIGNIFICANT CHANGE UP (ref 1.6–2.6)
MCHC RBC-ENTMCNC: 30.4 PG — SIGNIFICANT CHANGE UP (ref 27–34)
MCHC RBC-ENTMCNC: 33.4 % — SIGNIFICANT CHANGE UP (ref 32–36)
MCV RBC AUTO: 91 FL — SIGNIFICANT CHANGE UP (ref 80–100)
NRBC # FLD: 0 K/UL — SIGNIFICANT CHANGE UP (ref 0–0)
PHOSPHATE SERPL-MCNC: 1.7 MG/DL — LOW (ref 2.5–4.5)
PLATELET # BLD AUTO: 231 K/UL — SIGNIFICANT CHANGE UP (ref 150–400)
PMV BLD: 8.9 FL — SIGNIFICANT CHANGE UP (ref 7–13)
POTASSIUM SERPL-MCNC: 3.4 MMOL/L — LOW (ref 3.5–5.3)
POTASSIUM SERPL-SCNC: 3.4 MMOL/L — LOW (ref 3.5–5.3)
PROT SERPL-MCNC: 5.9 G/DL — LOW (ref 6–8.3)
RBC # BLD: 4.21 M/UL — SIGNIFICANT CHANGE UP (ref 4.2–5.8)
RBC # FLD: 13.2 % — SIGNIFICANT CHANGE UP (ref 10.3–14.5)
SODIUM SERPL-SCNC: 133 MMOL/L — LOW (ref 135–145)
WBC # BLD: 18.8 K/UL — HIGH (ref 3.8–10.5)
WBC # FLD AUTO: 18.8 K/UL — HIGH (ref 3.8–10.5)

## 2020-11-27 PROCEDURE — 74018 RADEX ABDOMEN 1 VIEW: CPT | Mod: 26

## 2020-11-27 PROCEDURE — 99232 SBSQ HOSP IP/OBS MODERATE 35: CPT | Mod: GC

## 2020-11-27 RX ORDER — POTASSIUM PHOSPHATE, MONOBASIC POTASSIUM PHOSPHATE, DIBASIC 236; 224 MG/ML; MG/ML
15 INJECTION, SOLUTION INTRAVENOUS ONCE
Refills: 0 | Status: COMPLETED | OUTPATIENT
Start: 2020-11-27 | End: 2020-11-27

## 2020-11-27 RX ORDER — LANOLIN ALCOHOL/MO/W.PET/CERES
3 CREAM (GRAM) TOPICAL AT BEDTIME
Refills: 0 | Status: DISCONTINUED | OUTPATIENT
Start: 2020-11-27 | End: 2020-11-28

## 2020-11-27 RX ORDER — LANOLIN ALCOHOL/MO/W.PET/CERES
5 CREAM (GRAM) TOPICAL AT BEDTIME
Refills: 0 | Status: DISCONTINUED | OUTPATIENT
Start: 2020-11-27 | End: 2020-11-27

## 2020-11-27 RX ADMIN — Medication 3 MILLIGRAM(S): at 02:53

## 2020-11-27 RX ADMIN — POLYETHYLENE GLYCOL 3350 17 GRAM(S): 17 POWDER, FOR SOLUTION ORAL at 12:39

## 2020-11-27 RX ADMIN — ENOXAPARIN SODIUM 40 MILLIGRAM(S): 100 INJECTION SUBCUTANEOUS at 12:39

## 2020-11-27 RX ADMIN — Medication 1: at 17:52

## 2020-11-27 RX ADMIN — OXYCODONE AND ACETAMINOPHEN 1 TABLET(S): 5; 325 TABLET ORAL at 22:15

## 2020-11-27 RX ADMIN — LISINOPRIL 10 MILLIGRAM(S): 2.5 TABLET ORAL at 05:16

## 2020-11-27 RX ADMIN — Medication 3 MILLIGRAM(S): at 21:29

## 2020-11-27 RX ADMIN — HYDROMORPHONE HYDROCHLORIDE 1 MILLIGRAM(S): 2 INJECTION INTRAMUSCULAR; INTRAVENOUS; SUBCUTANEOUS at 05:16

## 2020-11-27 RX ADMIN — OXYCODONE AND ACETAMINOPHEN 1 TABLET(S): 5; 325 TABLET ORAL at 21:29

## 2020-11-27 RX ADMIN — HYDROMORPHONE HYDROCHLORIDE 1 MILLIGRAM(S): 2 INJECTION INTRAMUSCULAR; INTRAVENOUS; SUBCUTANEOUS at 16:44

## 2020-11-27 RX ADMIN — SENNA PLUS 2 TABLET(S): 8.6 TABLET ORAL at 21:29

## 2020-11-27 RX ADMIN — HYDROMORPHONE HYDROCHLORIDE 1 MILLIGRAM(S): 2 INJECTION INTRAMUSCULAR; INTRAVENOUS; SUBCUTANEOUS at 17:15

## 2020-11-27 RX ADMIN — TAMSULOSIN HYDROCHLORIDE 0.4 MILLIGRAM(S): 0.4 CAPSULE ORAL at 21:29

## 2020-11-27 RX ADMIN — HYDROMORPHONE HYDROCHLORIDE 1 MILLIGRAM(S): 2 INJECTION INTRAMUSCULAR; INTRAVENOUS; SUBCUTANEOUS at 05:31

## 2020-11-27 RX ADMIN — POTASSIUM PHOSPHATE, MONOBASIC POTASSIUM PHOSPHATE, DIBASIC 62.5 MILLIMOLE(S): 236; 224 INJECTION, SOLUTION INTRAVENOUS at 09:43

## 2020-11-27 NOTE — PROGRESS NOTE ADULT - PROBLEM SELECTOR PLAN 6
Transitions of Care Status:  1.  Name of PCP: Dr Jaime Rossi [phone] (in [place])  2.  PCP Contacted on Admission: [ ] Y    [X ] N    3.  PCP contacted at Discharge: [ ] Y    [ ] N    [ ] N/A  4.  Post-Discharge Appointment Date and Location:  5.  Summary of Handoff given to PCP:

## 2020-11-27 NOTE — PROGRESS NOTE ADULT - SUBJECTIVE AND OBJECTIVE BOX
Chief Complaint:  Patient is a 70y old  Male who presents with a chief complaint of pancreatitis (27 Nov 2020 07:10)      Interval Events: White count downtrending. Pt denies nausea, vomiting, abd pain.   ROS: All 12 point system except listed above were otherwise negative.    Allergies:  No Known Allergies        Hospital Medications:  acetaminophen   Tablet .. 650 milliGRAM(s) Oral every 6 hours PRN  bisacodyl Suppository 10 milliGRAM(s) Rectal daily PRN  dextrose 40% Gel 15 Gram(s) Oral once  dextrose 5%. 1000 milliLiter(s) IV Continuous <Continuous>  dextrose 5%. 1000 milliLiter(s) IV Continuous <Continuous>  dextrose 50% Injectable 25 Gram(s) IV Push once  dextrose 50% Injectable 12.5 Gram(s) IV Push once  dextrose 50% Injectable 25 Gram(s) IV Push once  enoxaparin Injectable 40 milliGRAM(s) SubCutaneous daily  glucagon  Injectable 1 milliGRAM(s) IntraMuscular once  HYDROmorphone  Injectable 1 milliGRAM(s) IV Push every 6 hours PRN  insulin lispro (ADMELOG) corrective regimen sliding scale   SubCutaneous three times a day before meals  insulin lispro (ADMELOG) corrective regimen sliding scale   SubCutaneous at bedtime  lisinopril 10 milliGRAM(s) Oral daily  melatonin 3 milliGRAM(s) Oral at bedtime  oxycodone    5 mG/acetaminophen 325 mG 1 Tablet(s) Oral every 4 hours PRN  polyethylene glycol 3350 17 Gram(s) Oral daily  potassium phosphate IVPB 15 milliMole(s) IV Intermittent once  senna 2 Tablet(s) Oral at bedtime  sodium chloride 0.9%. 1000 milliLiter(s) IV Continuous <Continuous>  tamsulosin 0.4 milliGRAM(s) Oral at bedtime      PMHX/PSHX:  INGRID on CPAP    BPH (benign prostatic hyperplasia)    Diverticulitis    Type 2 diabetes mellitus    HTN (hypertension)    H/O inguinal hernia repair        Family history:    There is no family history of peptic ulcer disease, gastric cancer, colon polyps, colon cancer, celiac disease, biliary, hepatic, or pancreatic disease.  None of the female relatives have breast, uterine, or ovarian cancer.     PHYSICAL EXAM:   Vital Signs:  Vital Signs Last 24 Hrs  T(C): 37.1 (27 Nov 2020 05:14), Max: 37.6 (26 Nov 2020 21:20)  T(F): 98.8 (27 Nov 2020 05:14), Max: 99.6 (26 Nov 2020 21:20)  HR: 70 (27 Nov 2020 05:14) (67 - 71)  BP: 148/73 (27 Nov 2020 05:14) (148/73 - 162/67)  BP(mean): --  RR: 17 (27 Nov 2020 05:14) (17 - 18)  SpO2: 93% (27 Nov 2020 05:14) (93% - 96%)  Daily     Daily     PHYSICAL EXAM:     GENERAL:  Appears stated age, well-groomed  HEENT:  NC/AT,  conjunctivae clear and pink, no thyromegaly  CHEST:  Full & symmetric excursion, no increased effort, breath sounds clear  HEART:  Regular rhythm, S1, S2, no murmur/rub/S3/S4  ABDOMEN:  Soft, TTP throughout, non-distended, normoactive bowel sounds  EXTEREMITIES:  no cyanosis,clubbing or edema  SKIN:  No rash/erythema/ecchymoses  NEURO:  Alert, oriented, no asterixis  LABS:                        12.8   18.80 )-----------( 231      ( 27 Nov 2020 06:20 )             38.3     Mean Cell Volume: 91.0 fL (11-27-20 @ 06:20)    11-27    133<L>  |  98  |  9   ----------------------------<  132<H>  3.4<L>   |  24  |  0.69    Ca    8.5      27 Nov 2020 06:20  Phos  1.7     11-27  Mg     2.0     11-27    TPro  5.9<L>  /  Alb  2.9<L>  /  TBili  0.6  /  DBili  x   /  AST  14  /  ALT  13  /  AlkPhos  49  11-27    LIVER FUNCTIONS - ( 27 Nov 2020 06:20 )  Alb: 2.9 g/dL / Pro: 5.9 g/dL / ALK PHOS: 49 u/L / ALT: 13 u/L / AST: 14 u/L / GGT: x                                       12.8   18.80 )-----------( 231      ( 27 Nov 2020 06:20 )             38.3                         14.1   23.95 )-----------( 234      ( 26 Nov 2020 07:06 )             42.6                         15.0   22.38 )-----------( 228      ( 25 Nov 2020 06:12 )             46.1                         16.2   17.99 )-----------( 266      ( 24 Nov 2020 09:55 )             48.6     Imaging:

## 2020-11-27 NOTE — PROGRESS NOTE ADULT - ASSESSMENT
70 M with HTN, DM2, BPH, diverticulosis presenting with abdominal pain. Found to have pancreatitis, likely iatrogenic, following his pancreatic biopsy the day prior.    70 M with HTN, DM2, BPH, diverticulosis presenting with abdominal pain. Found to have pancreatitis, likely iatrogenic, following his pancreatic biopsy the day prior. Course complicated by constipation and abdominal distension.

## 2020-11-27 NOTE — PROGRESS NOTE ADULT - PROBLEM SELECTOR PLAN 1
Moderately elevated lipase with CT a/p showing evidence of pancreatitis. Likely 2/2 to pancreatic biopsy done day prior. Cholelithiasis seen but no cholecystitis or CBD dilatation, although mild enhancement of CBD suggests possible cholangitis.   - C/w IV hydration with NS at 150 cc/hr  - Pain control with tylenol prn for mild pain, oxycodone 5 for moderate pain, dilaudid 1mg for severe pain.  - Triglycerides wnl  - Monitor off antibiotics for now. But if starts spiking fevers, will get BCx and start abx for possible cholangitis  - clear liquid diet Moderately elevated lipase with CT a/p showing evidence of pancreatitis. Likely 2/2 to pancreatic biopsy done day prior. Cholelithiasis seen but no cholecystitis or CBD dilatation, although mild enhancement of CBD suggests possible cholangitis.   - C/w IV hydration with NS at 150 cc/hr  - Pain control with tylenol prn for mild pain, oxycodone 5 for moderate pain, dilaudid 1mg for severe pain.  - Triglycerides wnl  - Monitor off antibiotics for now. But if starts spiking fevers, will get BCx and start abx for possible cholangitis  - will advance to full liquid diet

## 2020-11-27 NOTE — PROGRESS NOTE ADULT - PROBLEM SELECTOR PLAN 2
No bowel movements in 2 days. Patient saying he is not passing gas either. Abd distended and patient feels bloated.  - CT a/p and Abd XR showing nonobstructive gas pattern  - aggressive bowel regimen: miralax, senna, dulcolax suppository  - Will order SMOG enema No bowel movements in 2 days. Patient saying he is not passing gas either. Abd distended and patient feels bloated.  - CT a/p and Abd XR showing nonobstructive gas pattern  - aggressive bowel regimen: miralax, senna, dulcolax suppository  - Will order SMOG enema  - Abdominal XR

## 2020-11-27 NOTE — PROGRESS NOTE ADULT - SUBJECTIVE AND OBJECTIVE BOX
PROGRESS NOTE:   Authored by Jong Landry MD  PGY-1, Internal Medicine  Pager [facility] [phone], J 21922     Patient is a 70y old  Male who presents with a chief complaint of pancreatitis (26 Nov 2020 07:21)      SUBJECTIVE / OVERNIGHT EVENTS: No events overnight.    ADDITIONAL REVIEW OF SYSTEMS: Denies fevers, chills, n/v.    MEDICATIONS  (STANDING):  dextrose 40% Gel 15 Gram(s) Oral once  dextrose 5%. 1000 milliLiter(s) (50 mL/Hr) IV Continuous <Continuous>  dextrose 5%. 1000 milliLiter(s) (100 mL/Hr) IV Continuous <Continuous>  dextrose 50% Injectable 25 Gram(s) IV Push once  dextrose 50% Injectable 12.5 Gram(s) IV Push once  dextrose 50% Injectable 25 Gram(s) IV Push once  enoxaparin Injectable 40 milliGRAM(s) SubCutaneous daily  glucagon  Injectable 1 milliGRAM(s) IntraMuscular once  insulin lispro (ADMELOG) corrective regimen sliding scale   SubCutaneous three times a day before meals  insulin lispro (ADMELOG) corrective regimen sliding scale   SubCutaneous at bedtime  lisinopril 10 milliGRAM(s) Oral daily  melatonin 3 milliGRAM(s) Oral at bedtime  polyethylene glycol 3350 17 Gram(s) Oral daily  senna 2 Tablet(s) Oral at bedtime  sodium chloride 0.9%. 1000 milliLiter(s) (150 mL/Hr) IV Continuous <Continuous>  tamsulosin 0.4 milliGRAM(s) Oral at bedtime    MEDICATIONS  (PRN):  acetaminophen   Tablet .. 650 milliGRAM(s) Oral every 6 hours PRN Mild Pain (1 - 3)  bisacodyl Suppository 10 milliGRAM(s) Rectal daily PRN Constipation  HYDROmorphone  Injectable 1 milliGRAM(s) IV Push every 6 hours PRN Severe Pain (7 - 10)  oxycodone    5 mG/acetaminophen 325 mG 1 Tablet(s) Oral every 4 hours PRN Moderate Pain (4 - 6)      CAPILLARY BLOOD GLUCOSE      POCT Blood Glucose.: 131 mg/dL (26 Nov 2020 21:25)  POCT Blood Glucose.: 161 mg/dL (26 Nov 2020 17:21)  POCT Blood Glucose.: 134 mg/dL (26 Nov 2020 12:29)  POCT Blood Glucose.: 169 mg/dL (26 Nov 2020 08:29)    I&O's Summary    26 Nov 2020 07:01  -  27 Nov 2020 07:00  --------------------------------------------------------  IN: 525 mL / OUT: 1200 mL / NET: -675 mL        PHYSICAL EXAM:  Vital Signs Last 24 Hrs  T(C): 37.1 (27 Nov 2020 05:14), Max: 37.6 (26 Nov 2020 21:20)  T(F): 98.8 (27 Nov 2020 05:14), Max: 99.6 (26 Nov 2020 21:20)  HR: 70 (27 Nov 2020 05:14) (66 - 71)  BP: 148/73 (27 Nov 2020 05:14) (148/73 - 162/67)  BP(mean): --  RR: 17 (27 Nov 2020 05:14) (17 - 18)  SpO2: 93% (27 Nov 2020 05:14) (93% - 96%)    CONSTITUTIONAL: NAD, lying in bed comfortably  RESPIRATORY: Normal respiratory effort; CTABL  CARDIOVASCULAR: Regular rate and rhythm, normal S1 and S2, no murmur/rub/gallop  ABDOMEN: Soft, nondistended, nontender to palpation, normoactive bowel sounds, no rebound/guarding  MUSCLOSKELETAL: no joint swelling or tenderness to palpation, FROM all extremities  NEURO: AAOx3 to person, place, and time, full and equal strength all extremities   EXTREMITIES: no pedal edema    LABS:                        12.8   18.80 )-----------( 231      ( 27 Nov 2020 06:20 )             38.3     11-26    132<L>  |  96<L>  |  9   ----------------------------<  138<H>  3.4<L>   |  26  |  0.69    Ca    9.1      26 Nov 2020 07:06  Phos  1.4     11-26  Mg     1.9     11-26    TPro  6.6  /  Alb  3.4  /  TBili  0.7  /  DBili  x   /  AST  15  /  ALT  13  /  AlkPhos  51  11-26              Culture - Blood (collected 25 Nov 2020 20:58)  Source: .Blood Blood-Venous  Preliminary Report (26 Nov 2020 21:00):    No growth to date.    Culture - Blood (collected 25 Nov 2020 14:28)  Source: .Blood Blood-Peripheral  Preliminary Report (26 Nov 2020 15:01):    No growth to date.    Culture - Blood (collected 24 Nov 2020 21:48)  Source: .Blood Blood-Venous  Preliminary Report (25 Nov 2020 22:01):    No growth to date.    Culture - Blood (collected 24 Nov 2020 21:48)  Source: .Blood Blood-Peripheral  Preliminary Report (25 Nov 2020 22:01):    No growth to date.        RADIOLOGY & ADDITIONAL TESTS:     PROGRESS NOTE:   Authored by Jong Landry MD  PGY-1, Internal Medicine  Pager [facility] [phone], J 49311     Patient is a 70y old  Male who presents with a chief complaint of pancreatitis (26 Nov 2020 07:21)      SUBJECTIVE / OVERNIGHT EVENTS: No events overnight. No BMs. Abdomen still distended. Minimal abdominal pain. Tolerating liquids without nausea/vomiting. No issues with urination.     ADDITIONAL REVIEW OF SYSTEMS: Denies fevers, chills, n/v.    MEDICATIONS  (STANDING):  dextrose 40% Gel 15 Gram(s) Oral once  dextrose 5%. 1000 milliLiter(s) (50 mL/Hr) IV Continuous <Continuous>  dextrose 5%. 1000 milliLiter(s) (100 mL/Hr) IV Continuous <Continuous>  dextrose 50% Injectable 25 Gram(s) IV Push once  dextrose 50% Injectable 12.5 Gram(s) IV Push once  dextrose 50% Injectable 25 Gram(s) IV Push once  enoxaparin Injectable 40 milliGRAM(s) SubCutaneous daily  glucagon  Injectable 1 milliGRAM(s) IntraMuscular once  insulin lispro (ADMELOG) corrective regimen sliding scale   SubCutaneous three times a day before meals  insulin lispro (ADMELOG) corrective regimen sliding scale   SubCutaneous at bedtime  lisinopril 10 milliGRAM(s) Oral daily  melatonin 3 milliGRAM(s) Oral at bedtime  polyethylene glycol 3350 17 Gram(s) Oral daily  senna 2 Tablet(s) Oral at bedtime  sodium chloride 0.9%. 1000 milliLiter(s) (150 mL/Hr) IV Continuous <Continuous>  tamsulosin 0.4 milliGRAM(s) Oral at bedtime    MEDICATIONS  (PRN):  acetaminophen   Tablet .. 650 milliGRAM(s) Oral every 6 hours PRN Mild Pain (1 - 3)  bisacodyl Suppository 10 milliGRAM(s) Rectal daily PRN Constipation  HYDROmorphone  Injectable 1 milliGRAM(s) IV Push every 6 hours PRN Severe Pain (7 - 10)  oxycodone    5 mG/acetaminophen 325 mG 1 Tablet(s) Oral every 4 hours PRN Moderate Pain (4 - 6)      CAPILLARY BLOOD GLUCOSE      POCT Blood Glucose.: 131 mg/dL (26 Nov 2020 21:25)  POCT Blood Glucose.: 161 mg/dL (26 Nov 2020 17:21)  POCT Blood Glucose.: 134 mg/dL (26 Nov 2020 12:29)  POCT Blood Glucose.: 169 mg/dL (26 Nov 2020 08:29)    I&O's Summary    26 Nov 2020 07:01  -  27 Nov 2020 07:00  --------------------------------------------------------  IN: 525 mL / OUT: 1200 mL / NET: -675 mL        PHYSICAL EXAM:  Vital Signs Last 24 Hrs  T(C): 37.1 (27 Nov 2020 05:14), Max: 37.6 (26 Nov 2020 21:20)  T(F): 98.8 (27 Nov 2020 05:14), Max: 99.6 (26 Nov 2020 21:20)  HR: 70 (27 Nov 2020 05:14) (66 - 71)  BP: 148/73 (27 Nov 2020 05:14) (148/73 - 162/67)  BP(mean): --  RR: 17 (27 Nov 2020 05:14) (17 - 18)  SpO2: 93% (27 Nov 2020 05:14) (93% - 96%)    CONSTITUTIONAL: NAD, lying in bed comfortably  RESPIRATORY: Normal respiratory effort; CTABL  CARDIOVASCULAR: Regular rate and rhythm, normal S1 and S2, no murmur/rub/gallop  ABDOMEN: Firm, distended, nontender to palpation, normoactive bowel sounds, no rebound/guarding  MUSCLOSKELETAL: no joint swelling or tenderness to palpation, FROM all extremities  NEURO: AAOx3 to person, place, and time, full and equal strength all extremities   EXTREMITIES: no pedal edema    LABS:                        12.8   18.80 )-----------( 231      ( 27 Nov 2020 06:20 )             38.3     11-26    132<L>  |  96<L>  |  9   ----------------------------<  138<H>  3.4<L>   |  26  |  0.69    Ca    9.1      26 Nov 2020 07:06  Phos  1.4     11-26  Mg     1.9     11-26    TPro  6.6  /  Alb  3.4  /  TBili  0.7  /  DBili  x   /  AST  15  /  ALT  13  /  AlkPhos  51  11-26              Culture - Blood (collected 25 Nov 2020 20:58)  Source: .Blood Blood-Venous  Preliminary Report (26 Nov 2020 21:00):    No growth to date.    Culture - Blood (collected 25 Nov 2020 14:28)  Source: .Blood Blood-Peripheral  Preliminary Report (26 Nov 2020 15:01):    No growth to date.    Culture - Blood (collected 24 Nov 2020 21:48)  Source: .Blood Blood-Venous  Preliminary Report (25 Nov 2020 22:01):    No growth to date.    Culture - Blood (collected 24 Nov 2020 21:48)  Source: .Blood Blood-Peripheral  Preliminary Report (25 Nov 2020 22:01):    No growth to date.        RADIOLOGY & ADDITIONAL TESTS:  r< from: Xray Abdomen 1 View PORTABLE -Urgent (Xray Abdomen 1 View PORTABLE -Urgent .) (11.27.20 @ 11:34) >  EXAM:  XR ABDOMEN PORTABLE URGENT 1V        PROCEDURE DATE:  Nov 27 2020         INTERPRETATION:  CLINICAL INFORMATION: Abdominal distention with history of pancreatitis and constipation.    EXAM: Supine view of the abdomen from 11/27/2020.    COMPARISON: CT abdomen pelvis from 11/24/2020.    FINDINGS:  There is a nonobstructive gas pattern with air seen in the stomach and gaseous distention of the large bowel.    No pneumoperitoneum.    The visualized osseous structures demonstrate no acute pathology.    IMPRESSION: Nonobstructive distention.      < end of copied text >

## 2020-11-27 NOTE — PROGRESS NOTE ADULT - ASSESSMENT
Impression:  1) Post EUS abdominal pain- Patient found to have leukocytosis and elevated lipase, likely developed pancreatitis post procedure  2) 2.5cm cystic lesion in pancreas likely IPMN      Recommendations:  - Advance diet as tolerated  - Aggressive IVF  - Monitor CBC and CMP   - Follow up pathology    Gi Apodaca, PGY6  Gastroenterology Fellow  Pager # [phone] / [phone]  Can be contacted via Microsoft Teams

## 2020-11-28 ENCOUNTER — TRANSCRIPTION ENCOUNTER (OUTPATIENT)
Age: 70
End: 2020-11-28

## 2020-11-28 VITALS
DIASTOLIC BLOOD PRESSURE: 66 MMHG | TEMPERATURE: 98 F | OXYGEN SATURATION: 95 % | RESPIRATION RATE: 16 BRPM | HEART RATE: 61 BPM | SYSTOLIC BLOOD PRESSURE: 139 MMHG

## 2020-11-28 LAB
ALBUMIN SERPL ELPH-MCNC: 2.8 G/DL — LOW (ref 3.3–5)
ALP SERPL-CCNC: 67 U/L — SIGNIFICANT CHANGE UP (ref 40–120)
ALT FLD-CCNC: 23 U/L — SIGNIFICANT CHANGE UP (ref 4–41)
ANION GAP SERPL CALC-SCNC: 13 MMO/L — SIGNIFICANT CHANGE UP (ref 7–14)
AST SERPL-CCNC: 24 U/L — SIGNIFICANT CHANGE UP (ref 4–40)
BILIRUB SERPL-MCNC: 0.8 MG/DL — SIGNIFICANT CHANGE UP (ref 0.2–1.2)
BUN SERPL-MCNC: 9 MG/DL — SIGNIFICANT CHANGE UP (ref 7–23)
CALCIUM SERPL-MCNC: 8.8 MG/DL — SIGNIFICANT CHANGE UP (ref 8.4–10.5)
CHLORIDE SERPL-SCNC: 96 MMOL/L — LOW (ref 98–107)
CO2 SERPL-SCNC: 25 MMOL/L — SIGNIFICANT CHANGE UP (ref 22–31)
CREAT SERPL-MCNC: 0.68 MG/DL — SIGNIFICANT CHANGE UP (ref 0.5–1.3)
GLUCOSE BLDC GLUCOMTR-MCNC: 144 MG/DL — HIGH (ref 70–99)
GLUCOSE BLDC GLUCOMTR-MCNC: 156 MG/DL — HIGH (ref 70–99)
GLUCOSE SERPL-MCNC: 143 MG/DL — HIGH (ref 70–99)
HCT VFR BLD CALC: 37.6 % — LOW (ref 39–50)
HGB BLD-MCNC: 12.5 G/DL — LOW (ref 13–17)
MAGNESIUM SERPL-MCNC: 2.3 MG/DL — SIGNIFICANT CHANGE UP (ref 1.6–2.6)
MCHC RBC-ENTMCNC: 30.5 PG — SIGNIFICANT CHANGE UP (ref 27–34)
MCHC RBC-ENTMCNC: 33.2 % — SIGNIFICANT CHANGE UP (ref 32–36)
MCV RBC AUTO: 91.7 FL — SIGNIFICANT CHANGE UP (ref 80–100)
NRBC # FLD: 0 K/UL — SIGNIFICANT CHANGE UP (ref 0–0)
PHOSPHATE SERPL-MCNC: 2 MG/DL — LOW (ref 2.5–4.5)
PLATELET # BLD AUTO: 252 K/UL — SIGNIFICANT CHANGE UP (ref 150–400)
PMV BLD: 9.2 FL — SIGNIFICANT CHANGE UP (ref 7–13)
POTASSIUM SERPL-MCNC: 3.5 MMOL/L — SIGNIFICANT CHANGE UP (ref 3.5–5.3)
POTASSIUM SERPL-SCNC: 3.5 MMOL/L — SIGNIFICANT CHANGE UP (ref 3.5–5.3)
PROT SERPL-MCNC: 6.1 G/DL — SIGNIFICANT CHANGE UP (ref 6–8.3)
RBC # BLD: 4.1 M/UL — LOW (ref 4.2–5.8)
RBC # FLD: 13.3 % — SIGNIFICANT CHANGE UP (ref 10.3–14.5)
SODIUM SERPL-SCNC: 134 MMOL/L — LOW (ref 135–145)
WBC # BLD: 18.27 K/UL — HIGH (ref 3.8–10.5)
WBC # FLD AUTO: 18.27 K/UL — HIGH (ref 3.8–10.5)

## 2020-11-28 PROCEDURE — 99239 HOSP IP/OBS DSCHRG MGMT >30: CPT

## 2020-11-28 RX ORDER — ACETAMINOPHEN 500 MG
2 TABLET ORAL
Qty: 0 | Refills: 0 | DISCHARGE
Start: 2020-11-28

## 2020-11-28 RX ADMIN — Medication 1: at 12:22

## 2020-11-28 RX ADMIN — LISINOPRIL 10 MILLIGRAM(S): 2.5 TABLET ORAL at 05:33

## 2020-11-28 RX ADMIN — POLYETHYLENE GLYCOL 3350 17 GRAM(S): 17 POWDER, FOR SOLUTION ORAL at 11:24

## 2020-11-28 RX ADMIN — Medication 5 MILLIGRAM(S): at 06:38

## 2020-11-28 RX ADMIN — ENOXAPARIN SODIUM 40 MILLIGRAM(S): 100 INJECTION SUBCUTANEOUS at 11:24

## 2020-11-28 NOTE — PROGRESS NOTE ADULT - PROBLEM SELECTOR PLAN 1
Moderately elevated lipase with CT a/p showing evidence of pancreatitis. Likely 2/2 to pancreatic biopsy done day prior. Cholelithiasis seen but no cholecystitis or CBD dilatation, although mild enhancement of CBD suggests possible cholangitis.   - C/w IV hydration with NS at 150 cc/hr  - Pain control with tylenol prn for mild pain, oxycodone 5 for moderate pain, dilaudid 1mg for severe pain.  - Triglycerides wnl  - Monitor off antibiotics for now. But if starts spiking fevers, will get BCx and start abx for possible cholangitis  - will advance to full liquid diet

## 2020-11-28 NOTE — DISCHARGE NOTE NURSING/CASE MANAGEMENT/SOCIAL WORK - PATIENT PORTAL LINK FT
You can access the FollowMyHealth Patient Portal offered by [facility] by registering at the following website: [email or web address]. By joining [facility]’s FollowMyHealth portal, you will also be able to view your health information using other applications (apps) compatible with our system.

## 2020-11-28 NOTE — PROGRESS NOTE ADULT - PROBLEM SELECTOR PLAN 5
DVT ppx: heparin subq  Diet: NPO for now, advance as tolerated  Dispo: likely home pending clinical improvement DVT ppx: lovenox   Diet: full liquid  Dispo: home

## 2020-11-28 NOTE — DISCHARGE NOTE PROVIDER - HOSPITAL COURSE
70 male with HTN, DM2, BPH, INGRID on CPAP, diverticulosis presenting with abdominal pain, nausea, vomiting 1 day after pancreatic cyst biopsy procedure. Patient was found to have pancreatitis, likely caused by his recent procedure. In the ED, patient was started on IV hydration and given pain medications. Patient was admitted for management of his pancreatitis. GI service followed the patient and recommended medical management for pt's pancreatitis. Diet was advanced as tolerated. Course was complicated by constipation and abdominal distension. Aggressive bowel regiment was started and patient eventually able to have a bowel movement after receiving a SMOG enema. Patient was medically optimized for discharge on 11/28/20. Patient was instructed to follow up with his PCP within 2 weeks after discharge from hospital. 70 male with HTN, DM2, BPH, INGRID on CPAP, diverticulosis presenting with abdominal pain, nausea, vomiting 1 day after pancreatic cyst biopsy procedure. Patient was found to have pancreatitis, likely caused by his recent procedure. In the ED, patient was started on IV hydration and given pain medications. Patient was admitted for management of his pancreatitis. GI service followed the patient and recommended medical management for pt's pancreatitis. Diet was advanced as tolerated. Course was complicated by constipation and abdominal distension. Aggressive bowel regiment was started and patient eventually able to have a bowel movement after receiving a SMOG enema. Patient tolerated regular diet with no issues. Patient was medically optimized for discharge on 11/28/20. Patient was instructed to follow up with his PCP within 2 weeks after discharge from hospital.

## 2020-11-28 NOTE — DISCHARGE NOTE PROVIDER - NSDCCPCAREPLAN_GEN_ALL_CORE_FT
PRINCIPAL DISCHARGE DIAGNOSIS  Diagnosis: Pancreatitis  Assessment and Plan of Treatment: You came to the hospital because you were experiencing abdominal pain and nausea. At the hospital, you were found to have pancreatitis, which was likely caused by the biopsy procedure performed the day prior to admission. You were given aggressive IV hydration and pain medications to help with your pain. Your pain improved and you tolerated your diet. You are being discharged with no additional new medications. Please continue your home medications. You can take tylenol as needed for pain. Please make an appointment with your PCP within 2 weeks after discharge from the hospital. If you experience increasing abdominal pain, nausea, vomiting, or persistent fevers, please come back to the ED.      SECONDARY DISCHARGE DIAGNOSES  Diagnosis: Constipation  Assessment and Plan of Treatment: Your hospital course was complicated by constipation for several days. The constipation was likely a result of your pancreatitis and from receiving opioid medications for pain. You were started on a bowel regimen that included stool softeners, laxatives, and enema. You were able to have a bowel movement and pass gas while in the hospital. Please gradually resume your normal diet as tolerated. You may take over the counter stool softeners as needed to help with your bowel movements. Please follow up with your PCP. If you experience abdominal pain, no passing of gas or stool, nausea, vomiting, please come back to the ED.

## 2020-11-28 NOTE — PROGRESS NOTE ADULT - PROBLEM SELECTOR PLAN 3
- Check A1C  - Hold home oral antihyperglycemics  - Low ISS  - Monitor FS Consent (Near Eyelid Margin)/Introductory Paragraph: The rationale for Mohs was explained to the patient and consent was obtained. The risks, benefits and alternatives to therapy were discussed in detail. Specifically, the risks of ectropion or eyelid deformity, infection, scarring, bleeding, prolonged wound healing, incomplete removal, allergy to anesthesia, nerve injury and recurrence were addressed. Prior to the procedure, the treatment site was clearly identified and confirmed by the patient. All components of Universal Protocol/PAUSE Rule completed.

## 2020-11-28 NOTE — DISCHARGE NOTE PROVIDER - NSDCMRMEDTOKEN_GEN_ALL_CORE_FT
acetaminophen 325 mg oral tablet: 2 tab(s) orally every 6 hours, As needed, Mild Pain (1 - 3)  Flomax 0.4 mg oral capsule: 1 cap(s) orally once a day  glimepiride 2 mg oral tablet: 1 tab(s) orally once a day AM   lisinopril 10 mg oral tablet: 1 tab(s) orally once a day  Multiple Vitamins oral gum: 2  orally once a day

## 2020-11-28 NOTE — DISCHARGE NOTE PROVIDER - NSDCFUADDINST_GEN_ALL_CORE_FT
Please follow up with your PCP within 2 weeks after discharge from the hospital. If you do not have a PCP, you can make an appointment at the resident clinic (address and phone number provided) to follow up after your discharge.

## 2020-11-28 NOTE — DISCHARGE NOTE PROVIDER - NSFOLLOWUPCLINICS_GEN_ALL_ED_FT
[facility] - Primary Care  Primary Care  [address] [place] [address]  Phone: [phone]  Fax:   Follow Up Time: 2 weeks

## 2020-11-28 NOTE — PROGRESS NOTE ADULT - SUBJECTIVE AND OBJECTIVE BOX
PROGRESS NOTE:   Authored by Jong Landry MD  PGY-1, Internal Medicine  Pager [facility] [phone], J 11730     Patient is a 70y old  Male who presents with a chief complaint of pancreatitis (27 Nov 2020 08:49)      SUBJECTIVE / OVERNIGHT EVENTS: No events overnight. Requesting oral dulcolax overnight and CPAP machine for his sleep apnea.     ADDITIONAL REVIEW OF SYSTEMS: Denies fevers, chills, n/v.    MEDICATIONS  (STANDING):  dextrose 40% Gel 15 Gram(s) Oral once  dextrose 5%. 1000 milliLiter(s) (50 mL/Hr) IV Continuous <Continuous>  dextrose 5%. 1000 milliLiter(s) (100 mL/Hr) IV Continuous <Continuous>  dextrose 50% Injectable 25 Gram(s) IV Push once  dextrose 50% Injectable 12.5 Gram(s) IV Push once  dextrose 50% Injectable 25 Gram(s) IV Push once  enoxaparin Injectable 40 milliGRAM(s) SubCutaneous daily  glucagon  Injectable 1 milliGRAM(s) IntraMuscular once  insulin lispro (ADMELOG) corrective regimen sliding scale   SubCutaneous three times a day before meals  insulin lispro (ADMELOG) corrective regimen sliding scale   SubCutaneous at bedtime  lisinopril 10 milliGRAM(s) Oral daily  melatonin 3 milliGRAM(s) Oral at bedtime  polyethylene glycol 3350 17 Gram(s) Oral daily  senna 2 Tablet(s) Oral at bedtime  sodium chloride 0.9%. 1000 milliLiter(s) (150 mL/Hr) IV Continuous <Continuous>  tamsulosin 0.4 milliGRAM(s) Oral at bedtime    MEDICATIONS  (PRN):  acetaminophen   Tablet .. 650 milliGRAM(s) Oral every 6 hours PRN Mild Pain (1 - 3)  bisacodyl Suppository 10 milliGRAM(s) Rectal daily PRN Constipation  HYDROmorphone  Injectable 1 milliGRAM(s) IV Push every 6 hours PRN Severe Pain (7 - 10)  oxycodone    5 mG/acetaminophen 325 mG 1 Tablet(s) Oral every 4 hours PRN Moderate Pain (4 - 6)      CAPILLARY BLOOD GLUCOSE      POCT Blood Glucose.: 135 mg/dL (27 Nov 2020 21:27)  POCT Blood Glucose.: 156 mg/dL (27 Nov 2020 17:32)  POCT Blood Glucose.: 142 mg/dL (27 Nov 2020 12:10)  POCT Blood Glucose.: 124 mg/dL (27 Nov 2020 08:29)    I&O's Summary      PHYSICAL EXAM:  Vital Signs Last 24 Hrs  T(C): 36.4 (28 Nov 2020 05:33), Max: 36.9 (27 Nov 2020 12:28)  T(F): 97.6 (28 Nov 2020 05:33), Max: 98.5 (27 Nov 2020 12:28)  HR: 60 (28 Nov 2020 05:33) (60 - 63)  BP: 144/61 (28 Nov 2020 05:33) (136/68 - 153/79)  BP(mean): --  RR: 17 (28 Nov 2020 05:33) (17 - 18)  SpO2: 95% (28 Nov 2020 05:33) (95% - 100%)    CONSTITUTIONAL: NAD, lying in bed comfortably  RESPIRATORY: Normal respiratory effort; CTABL  CARDIOVASCULAR: Regular rate and rhythm, normal S1 and S2, no murmur/rub/gallop  ABDOMEN: Soft, nondistended, nontender to palpation, normoactive bowel sounds, no rebound/guarding  MUSCLOSKELETAL: no joint swelling or tenderness to palpation, FROM all extremities  NEURO: AAOx3 to person, place, and time, full and equal strength all extremities   EXTREMITIES: no pedal edema    LABS:                        12.8   18.80 )-----------( 231      ( 27 Nov 2020 06:20 )             38.3     11-27    133<L>  |  98  |  9   ----------------------------<  132<H>  3.4<L>   |  24  |  0.69    Ca    8.5      27 Nov 2020 06:20  Phos  1.7     11-27  Mg     2.0     11-27    TPro  5.9<L>  /  Alb  2.9<L>  /  TBili  0.6  /  DBili  x   /  AST  14  /  ALT  13  /  AlkPhos  49  11-27              Culture - Blood (collected 25 Nov 2020 20:58)  Source: .Blood Blood-Venous  Preliminary Report (26 Nov 2020 21:00):    No growth to date.    Culture - Blood (collected 25 Nov 2020 14:28)  Source: .Blood Blood-Peripheral  Preliminary Report (26 Nov 2020 15:01):    No growth to date.        RADIOLOGY & ADDITIONAL TESTS:     PROGRESS NOTE:   Authored by Jong Landry MD  PGY-1, Internal Medicine  Pager [facility] [phone], J [id number]     Patient is a 70y old  Male who presents with a chief complaint of pancreatitis (27 Nov 2020 08:49)      SUBJECTIVE / OVERNIGHT EVENTS: No events overnight. Requesting oral dulcolax overnight and CPAP machine for his sleep apnea. Had BM yesterday after receiving SMOG enema.     ADDITIONAL REVIEW OF SYSTEMS: Denies fevers, chills, n/v.    MEDICATIONS  (STANDING):  dextrose 40% Gel 15 Gram(s) Oral once  dextrose 5%. 1000 milliLiter(s) (50 mL/Hr) IV Continuous <Continuous>  dextrose 5%. 1000 milliLiter(s) (100 mL/Hr) IV Continuous <Continuous>  dextrose 50% Injectable 25 Gram(s) IV Push once  dextrose 50% Injectable 12.5 Gram(s) IV Push once  dextrose 50% Injectable 25 Gram(s) IV Push once  enoxaparin Injectable 40 milliGRAM(s) SubCutaneous daily  glucagon  Injectable 1 milliGRAM(s) IntraMuscular once  insulin lispro (ADMELOG) corrective regimen sliding scale   SubCutaneous three times a day before meals  insulin lispro (ADMELOG) corrective regimen sliding scale   SubCutaneous at bedtime  lisinopril 10 milliGRAM(s) Oral daily  melatonin 3 milliGRAM(s) Oral at bedtime  polyethylene glycol 3350 17 Gram(s) Oral daily  senna 2 Tablet(s) Oral at bedtime  sodium chloride 0.9%. 1000 milliLiter(s) (150 mL/Hr) IV Continuous <Continuous>  tamsulosin 0.4 milliGRAM(s) Oral at bedtime    MEDICATIONS  (PRN):  acetaminophen   Tablet .. 650 milliGRAM(s) Oral every 6 hours PRN Mild Pain (1 - 3)  bisacodyl Suppository 10 milliGRAM(s) Rectal daily PRN Constipation  HYDROmorphone  Injectable 1 milliGRAM(s) IV Push every 6 hours PRN Severe Pain (7 - 10)  oxycodone    5 mG/acetaminophen 325 mG 1 Tablet(s) Oral every 4 hours PRN Moderate Pain (4 - 6)      CAPILLARY BLOOD GLUCOSE      POCT Blood Glucose.: 135 mg/dL (27 Nov 2020 21:27)  POCT Blood Glucose.: 156 mg/dL (27 Nov 2020 17:32)  POCT Blood Glucose.: 142 mg/dL (27 Nov 2020 12:10)  POCT Blood Glucose.: 124 mg/dL (27 Nov 2020 08:29)    I&O's Summary      PHYSICAL EXAM:  Vital Signs Last 24 Hrs  T(C): 36.4 (28 Nov 2020 05:33), Max: 36.9 (27 Nov 2020 12:28)  T(F): 97.6 (28 Nov 2020 05:33), Max: 98.5 (27 Nov 2020 12:28)  HR: 60 (28 Nov 2020 05:33) (60 - 63)  BP: 144/61 (28 Nov 2020 05:33) (136/68 - 153/79)  BP(mean): --  RR: 17 (28 Nov 2020 05:33) (17 - 18)  SpO2: 95% (28 Nov 2020 05:33) (95% - 100%)    CONSTITUTIONAL: NAD, lying in bed comfortably  RESPIRATORY: Normal respiratory effort; CTABL  CARDIOVASCULAR: Regular rate and rhythm, normal S1 and S2, no murmur/rub/gallop  ABDOMEN: Soft, nondistended, nontender to palpation, normoactive bowel sounds, no rebound/guarding  MUSCLOSKELETAL: no joint swelling or tenderness to palpation, FROM all extremities  NEURO: AAOx3 to person, place, and time, full and equal strength all extremities   EXTREMITIES: no pedal edema    LABS:                        12.8   18.80 )-----------( 231      ( 27 Nov 2020 06:20 )             38.3     11-27    133<L>  |  98  |  9   ----------------------------<  132<H>  3.4<L>   |  24  |  0.69    Ca    8.5      27 Nov 2020 06:20  Phos  1.7     11-27  Mg     2.0     11-27    TPro  5.9<L>  /  Alb  2.9<L>  /  TBili  0.6  /  DBili  x   /  AST  14  /  ALT  13  /  AlkPhos  49  11-27              Culture - Blood (collected 25 Nov 2020 20:58)  Source: .Blood Blood-Venous  Preliminary Report (26 Nov 2020 21:00):    No growth to date.    Culture - Blood (collected 25 Nov 2020 14:28)  Source: .Blood Blood-Peripheral  Preliminary Report (26 Nov 2020 15:01):    No growth to date.        RADIOLOGY & ADDITIONAL TESTS:

## 2020-11-28 NOTE — PROGRESS NOTE ADULT - PROBLEM SELECTOR PLAN 6
Transitions of Care Status:  1.  Name of PCP: Dr Jaime Rossi [phone] (in [place])  2.  PCP Contacted on Admission: [ ] Y    [X ] N    3.  PCP contacted at Discharge: [ ] Y    [ ] N    [ ] N/A  4.  Post-Discharge Appointment Date and Location:  5.  Summary of Handoff given to PCP:

## 2020-11-28 NOTE — PROGRESS NOTE ADULT - PROBLEM SELECTOR PLAN 2
No bowel movements in 2 days. Patient saying he is not passing gas either. Abd distended and patient feels bloated.  - CT a/p and Abd XR showing nonobstructive gas pattern  - aggressive bowel regimen: miralax, senna, dulcolax suppository  - Will order SMOG enema  - Abdominal XR No bowel movements in 2 days. Patient saying he is not passing gas either. Abd distended and patient feels bloated.  - CT a/p and Abd XR showing nonobstructive gas pattern  - aggressive bowel regimen: miralax, senna, dulcolax suppository  - s/p SMOG enema, patient had one large watery bowel movement  - Abdomen still distended despite bowel movement yesterday, possibly worsened by patient's opioid pain med use. Will hold off opioids for now.

## 2020-11-28 NOTE — PROGRESS NOTE ADULT - ATTENDING COMMENTS
70 year old male with hx of HTN, DM2, diverticulosis, BPH who presents with abdominal pain after a pancreatic cyst biopsy yesterday, found to have pancreatitis. Lipase 382 and CT scan with imaging significant for pancreatitis and gallstones. TG wnl. GI following. LR at 150cc/hr, advance diet to clears today, pain control with dilaudid 1mg q4 hours prn pain. As per GI, low threshold to consider infection. Recommended blood cultures, f/u. DM2, hold home meds and start ISS. BPH, c/w flomax. HTN. c/w lisinopril. Constipation, start bowel regimen.
70 year old male with hx of HTN, DM2, diverticulosis, BPH who presents with abdominal pain after a pancreatic cyst biopsy yesterday, found to have pancreatitis. Lipase 382 and CT scan with imaging significant for pancreatitis and gallstones. TG wnl. GI following.  pain control with dilaudid 1mg q4 hours prn pain. As per GI, low threshold to consider infection. Recommended blood cultures NGTD without fever. DM2, hold home meds and start ISS. BPH, c/w flomax. HTN. c/w lisinopril. Constipation, start bowel regimen passing gas and add dulcolax. abdominal pain improved since admission. IVF NS. clear liquid advance diet to low fat diet.
70 year old male with hx of HTN, DM2, diverticulosis, BPH who presents with abdominal pain after a pancreatic cyst biopsy yesterday, found to have post ERCP pancreatitis. Lipase 382 and CT scan with imaging significant for pancreatitis and gallstones. pain improved. s/p BM yesterday. afebrile Blood Cx NGTD. advance diet if tolerating and cleared by GI can be discharged with outpt f/u with GI
70 year old male with hx of HTN, DM2, diverticulosis, BPH who presents with abdominal pain after a pancreatic cyst biopsy yesterday, found to have pancreatitis. Lipase 382 and CT scan with imaging significant for pancreatitis and gallstones. TG wnl. GI following.  pain control with dilaudid 1mg q4 hours prn pain. As per GI, low threshold to consider infection. Recommended blood cultures NGTD without fever. DM2, hold home meds and start ISS. BPH, c/w flomax. HTN. c/w lisinopril. Constipation, no BM today. C/w bowel regimen. Enema. Dispo pending bowel movement.

## 2020-11-29 LAB
CULTURE RESULTS: SIGNIFICANT CHANGE UP
CULTURE RESULTS: SIGNIFICANT CHANGE UP
SPECIMEN SOURCE: SIGNIFICANT CHANGE UP
SPECIMEN SOURCE: SIGNIFICANT CHANGE UP

## 2020-11-30 LAB
CULTURE RESULTS: SIGNIFICANT CHANGE UP
CULTURE RESULTS: SIGNIFICANT CHANGE UP
SPECIMEN SOURCE: SIGNIFICANT CHANGE UP
SPECIMEN SOURCE: SIGNIFICANT CHANGE UP
SURGICAL PATHOLOGY STUDY: SIGNIFICANT CHANGE UP

## 2020-12-02 DIAGNOSIS — K86.2 CYST OF PANCREAS: ICD-10-CM

## 2020-12-12 ENCOUNTER — TRANSCRIPTION ENCOUNTER (OUTPATIENT)
Age: 70
End: 2020-12-12

## 2021-10-13 NOTE — PROGRESS NOTE ADULT - ASSESSMENT
70 M with HTN, DM2, BPH, diverticulosis presenting with abdominal pain. Found to have pancreatitis, likely iatrogenic, following his pancreatic biopsy the day prior. Course complicated by constipation and abdominal distension.    no

## 2021-10-14 ENCOUNTER — APPOINTMENT (OUTPATIENT)
Dept: GASTROENTEROLOGY | Facility: CLINIC | Age: 71
End: 2021-10-14
Payer: MEDICARE

## 2021-10-14 VITALS
WEIGHT: 225 LBS | OXYGEN SATURATION: 94 % | HEART RATE: 82 BPM | SYSTOLIC BLOOD PRESSURE: 130 MMHG | HEIGHT: 72 IN | DIASTOLIC BLOOD PRESSURE: 72 MMHG | TEMPERATURE: 97.7 F | BODY MASS INDEX: 30.48 KG/M2

## 2021-10-14 DIAGNOSIS — Z78.9 OTHER SPECIFIED HEALTH STATUS: ICD-10-CM

## 2021-10-14 DIAGNOSIS — K86.2 CYST OF PANCREAS: ICD-10-CM

## 2021-10-14 PROCEDURE — 99203 OFFICE O/P NEW LOW 30 MIN: CPT

## 2021-10-14 PROCEDURE — 99213 OFFICE O/P EST LOW 20 MIN: CPT

## 2021-11-04 ENCOUNTER — OUTPATIENT (OUTPATIENT)
Dept: OUTPATIENT SERVICES | Facility: HOSPITAL | Age: 71
LOS: 1 days | End: 2021-11-04
Payer: MEDICARE

## 2021-11-04 ENCOUNTER — APPOINTMENT (OUTPATIENT)
Dept: MRI IMAGING | Facility: CLINIC | Age: 71
End: 2021-11-04
Payer: MEDICARE

## 2021-11-04 DIAGNOSIS — Z98.890 OTHER SPECIFIED POSTPROCEDURAL STATES: Chronic | ICD-10-CM

## 2021-11-04 DIAGNOSIS — K86.2 CYST OF PANCREAS: ICD-10-CM

## 2021-11-04 PROCEDURE — G1004: CPT

## 2021-11-04 PROCEDURE — 74181 MRI ABDOMEN W/O CONTRAST: CPT | Mod: 26,ME

## 2021-11-04 PROCEDURE — 74181 MRI ABDOMEN W/O CONTRAST: CPT | Mod: ME

## 2021-12-07 PROBLEM — K86.2 PANCREATIC CYST: Status: ACTIVE | Noted: 2020-10-12

## 2021-12-07 PROBLEM — Z78.9 NO HISTORY OF ALCOHOL USE: Status: ACTIVE | Noted: 2021-12-07

## 2021-12-07 NOTE — HISTORY OF PRESENT ILLNESS
[FreeTextEntry1] : 71 referred for large HOP/Uncinate cyst in the past\par Prior exam showed 2.5 cm cyst with probably communication with main [employer] and a mural nodule which was previously biopsied but did not reveal any dysplasia \par He denies steatorrhea\par He denies change in weight os change in appetite\par No abdominal pain. No back pain. No nausea or vomiting. \par Since last year no follow up. \par

## 2021-12-31 NOTE — ED ADULT TRIAGE NOTE - NS ED NURSE BANDS TYPE
DATE OF SERVICE: 12/31/2021   Patient was seen,examined and evaluated  by me.ER evaluation, Labs and Hospital course was reviewed,    CHIEF COMPLAINT:    HPI:  · Chief Complaint: The patient is a 67y Female complaining of dizziness.  · HPI Objective Statement: [place]  used, #[id number]  68 y/o female with hx of HTN, CVA (denies any deficits) p/w 2 days of dizziness and nausea. Pt also endorsing some photophobia. Pt describes dizziness as "I just feel discomfort" and denies any room-spinning. Pt states she has been too dizzy to walk. Pt denies any recent surgeries. Pt denies any fever, chest pain, shortness of breath, belly pain, headache, focal numbness/weakness, vomiting, diarrhea, or any other recent illnesses and hospitalizations.        PAST MEDICAL & SURGICAL HISTORY:  Stroke    HTN (hypertension)    No significant past surgical history        MEDICATIONS  (STANDING):      FAMILY HISTORY:    No family history of premature coronary artery disease or sudden cardiac death    SOCIAL HISTORY:  Smoking-[ ] Active  [ ] Former [ ] Non Smoker  Alcohol-[ ] Denies [ ] Social [ ] Daily  Ilicit Drug use-[ ] Denies [ ] Active user    REVIEW OF SYSTEMS:  Constitutional: [ ] fever, [ ]weight loss, [ ]fatigue   Activity [ ] Bedbound,[ ] Ambulates [ ] Unassisted[ ] Cane/Walker [ ] Assistence.  Effort tolerance:[ ] Excellent [ ] Good [ ] Fair [ ] Poor [ ]  Eyes: [ ] visual changes  Respiratory: [ ]shortness of breath;  [ ] cough, [ ]wheezing, [ ]chills, [ ]hemoptysis  Cardiovascular: [ ] chest pain, [ ]palpitations, [ ]dizziness,  [ ]leg swelling[ ]orthopnea [ ]PND  Gastrointestinal: [ ] abdominal pain, [ ]nausea, [ ]vomiting,  [ ]diarrhea,[ ]constipation  Genitourinary: [ ] dysuria, [ ] hematuria  Neurologic: [ ] headaches [ ] tremors[ ] weakness  Skin: [ ] itching, [ ]burning, [ ] rashes  Endocrine: [ ] heat or cold intolerance  Musculoskeletal: [ ] joint pain or swelling; [ ] muscle, back, or extremity pain  Psychiatric: [ ] depression, [ ]anxiety, [ ]mood swings, or [ ]difficulty sleeping  Hematologic: [ ] easy bruising, [ ] bleeding gums       [ x] All others negative	  [ ] Unable to obtain    Vital Signs Last 24 Hrs  T(C): 37 (31 Dec 2021 08:00), Max: 37.1 (31 Dec 2021 03:40)  T(F): 98.6 (31 Dec 2021 08:00), Max: 98.7 (31 Dec 2021 03:40)  HR: 60 (31 Dec 2021 08:00) (60 - 67)  BP: 152/73 (31 Dec 2021 08:00) (150/84 - 152/73)  BP(mean): --  RR: 18 (31 Dec 2021 08:00) (18 - 18)  SpO2: 96% (31 Dec 2021 08:00) (96% - 99%)  I&O's Summary      PHYSICAL EXAM:  General: No acute distress BMI-  HEENT: EOMI, PERRL[ ] Icteric  Neck: Supple, No JVD  Lungs: Equal air entry bilaterally; [ ] Rales [ ] Rhonchi [ ] Wheezing  Heart: Regular rate and rhythm;[ ] Murmurs-   /6 [ ] Systolic [ ] Diastolic [ ] Radiation,No rubs, or gallops  Abdomen: Nontender, bowel sounds present  Extremities: No clubbing, cyanosis, or edema[ ] Calf tenderness  Nervous system:  Alert & Oriented X3, no focal deficits  Psychiatric: Normal affect  Skin: No rashes or lesions      LABS:  12-31    124<L>  |  85<L>  |  11  ----------------------------<  138<H>  3.3<L>   |  29  |  0.84    Ca    8.6      31 Dec 2021 04:57  Mg     1.9     12-31    TPro  8.4<H>  /  Alb  3.9  /  TBili  0.4  /  DBili  x   /  AST  24  /  ALT  27  /  AlkPhos  65  12-31    Creatinine Trend: 0.84<--                        12.2   2.73  )-----------( 139      ( 31 Dec 2021 04:57 )             37.8     PT/INR - ( 31 Dec 2021 04:57 )   PT: 11.9 sec;   INR: 1.00 ratio         PTT - ( 31 Dec 2021 04:57 )  PTT:46.9 sec    Lipid Panel:   Cardiac Enzymes:     Serum Pro-Brain Natriuretic Peptide: 237 pg/mL (12-31-21 @ 04:57)        RADIOLOGY:    ECG [my interpretation]:    TELEMETRY:    ECHO:    STRESS TEST:    CATHETERIZATION:
Allergy;

## 2022-03-06 NOTE — H&P PST ADULT - RESPIRATORY
Dr. Monika Marie  Pager [phone]    PROGRESS NOTE:     Patient is a 74y old  Male who presents with a chief complaint of s/p TURP (03 Mar 2022 15:51)      SUBJECTIVE / OVERNIGHT EVENTS:   pt had postop bleeding requiring CBI/traction, hypotension, s/p RTOR for clot evacuation and transfused 2 units pRBC postop  pt feels better today, bp controlled,   ADDITIONAL REVIEW OF SYSTEMS: no chest pain/sob    MEDICATIONS  (STANDING):  ATENolol  Tablet 50 milliGRAM(s) Oral daily  ciprofloxacin   IVPB 400 milliGRAM(s) IV Intermittent every 12 hours  dextrose 40% Gel 15 Gram(s) Oral once  dextrose 5%. 1000 milliLiter(s) (50 mL/Hr) IV Continuous <Continuous>  dextrose 5%. 1000 milliLiter(s) (100 mL/Hr) IV Continuous <Continuous>  dextrose 50% Injectable 25 Gram(s) IV Push once  dextrose 50% Injectable 12.5 Gram(s) IV Push once  dextrose 50% Injectable 25 Gram(s) IV Push once  digoxin     Tablet 125 MICROGram(s) Oral daily  enoxaparin Injectable 120 milliGRAM(s) SubCutaneous every 24 hours  famotidine    Tablet 20 milliGRAM(s) Oral two times a day  glucagon  Injectable 1 milliGRAM(s) IntraMuscular once  insulin lispro (ADMELOG) corrective regimen sliding scale   SubCutaneous three times a day before meals  insulin lispro (ADMELOG) corrective regimen sliding scale   SubCutaneous at bedtime    MEDICATIONS  (PRN):  HYDROmorphone  Injectable 0.5 milliGRAM(s) IV Push every 10 minutes PRN Moderate Pain (4 - 6)  HYDROmorphone  Injectable 0.5 milliGRAM(s) IV Push every 10 minutes PRN Moderate Pain (4 - 6)  HYDROmorphone  Injectable 1 milliGRAM(s) IV Push every 10 minutes PRN Severe Pain (7 - 10)  ondansetron Injectable 4 milliGRAM(s) IV Push once PRN Nausea and/or Vomiting  oxyCODONE    IR 5 milliGRAM(s) Oral once PRN Moderate Pain (4 - 6)      CAPILLARY BLOOD GLUCOSE      POCT Blood Glucose.: 335 mg/dL (04 Mar 2022 10:57)  POCT Blood Glucose.: 328 mg/dL (04 Mar 2022 06:13)  POCT Blood Glucose.: 304 mg/dL (04 Mar 2022 00:49)  POCT Blood Glucose.: 375 mg/dL (04 Mar 2022 00:47)  POCT Blood Glucose.: 282 mg/dL (03 Mar 2022 22:54)  POCT Blood Glucose.: 190 mg/dL (03 Mar 2022 16:47)  POCT Blood Glucose.: 104 mg/dL (03 Mar 2022 15:00)  POCT Blood Glucose.: 134 mg/dL (03 Mar 2022 12:52)    I&O's Summary    03 Mar 2022 07:01  -  04 Mar 2022 07:00  --------------------------------------------------------  IN: 1428 mL / OUT: 0 mL / NET: 1428 mL        PHYSICAL EXAM:  Vital Signs Last 24 Hrs  T(C): 37.1 (04 Mar 2022 09:00), Max: 37.1 (04 Mar 2022 09:00)  T(F): 98.8 (04 Mar 2022 09:00), Max: 98.8 (04 Mar 2022 09:00)  HR: 93 (04 Mar 2022 11:00) (35 - 99)  BP: 107/56 (04 Mar 2022 11:00) (79/50 - 172/105)  BP(mean): 67 (04 Mar 2022 11:00) (57 - 119)  RR: 22 (04 Mar 2022 11:00) (16 - 30)  SpO2: 100% (04 Mar 2022 11:00) (91% - 100%)  GENERAL: NAD, well-developed  HEAD:  Atraumatic, Normocephalic  EYES: EOMI, PERRLA, conjunctiva and sclera clear  NECK: Supple, No JVD  CHEST/LUNG: Clear to auscultation bilaterally; No wheeze  HEART: Regular rate and rhythm; Mechanical click  ABDOMEN: Soft, nontender, nondistended   garcia with clear yellow urine, CBI  EXTREMITIES:  2+ Peripheral Pulses, No clubbing, cyanosis, or edema  PSYCH: AAOx3  NEUROLOGY: non-focal  SKIN: No rashes or lesions    LABS:                        9.5    17.28 )-----------( 142      ( 04 Mar 2022 06:30 )             26.9     03-04    135  |  104  |  33<H>  ----------------------------<  346<H>  4.1   |  17<L>  |  1.56<H>    Ca    7.5<L>      04 Mar 2022 06:30      PT/INR - ( 03 Mar 2022 15:18 )   PT: 13.4 sec;   INR: 1.15 ratio         PTT - ( 03 Mar 2022 15:18 )  PTT:35.4 sec      RADIOLOGY & ADDITIONAL TESTS:  Results Reviewed:   Imaging Personally Reviewed:    Electrocardiogram Personally Reviewed:    COORDINATION OF CARE:  Care Discussed with Consultants/Other Providers [Y/N]: Urology FRANCISCA Welch, s/p RTOR for clot evac, transfused 2 units, improved, start lantus 15u hs  Prior or Outpatient Records Reviewed [Y/N]:   Eduard Bailey  [phone]  All Cardiology service information can be found 24/7 on [email or web address], password: [email or web address]    Overnight Events: No events overnight.     Review Of Systems: No chest pain, shortness of breath, or palpitations            Current Meds:  ATENolol  Tablet 50 milliGRAM(s) Oral daily  ciprofloxacin   IVPB 400 milliGRAM(s) IV Intermittent every 12 hours  dextrose 40% Gel 15 Gram(s) Oral once  dextrose 5%. 1000 milliLiter(s) IV Continuous <Continuous>  dextrose 5%. 1000 milliLiter(s) IV Continuous <Continuous>  dextrose 50% Injectable 25 Gram(s) IV Push once  dextrose 50% Injectable 12.5 Gram(s) IV Push once  dextrose 50% Injectable 25 Gram(s) IV Push once  digoxin     Tablet 125 MICROGram(s) Oral daily  enoxaparin Injectable 120 milliGRAM(s) SubCutaneous every 24 hours  famotidine    Tablet 20 milliGRAM(s) Oral two times a day  glucagon  Injectable 1 milliGRAM(s) IntraMuscular once  insulin glargine Injectable (LANTUS) 15 Unit(s) SubCutaneous at bedtime  insulin lispro (ADMELOG) corrective regimen sliding scale   SubCutaneous three times a day before meals  insulin lispro (ADMELOG) corrective regimen sliding scale   SubCutaneous at bedtime  sodium chloride 0.9%. 1000 milliLiter(s) IV Continuous <Continuous>      Vitals:  T(F): 98.4 (03-05), Max: 99 (03-04)  HR: 93 (03-05) (86 - 105)  BP: 139/80 (03-05) (95/50 - 139/80)  RR: 18 (03-05)  SpO2: 99% (03-05)  I&O's Summary    04 Mar 2022 07:01  -  05 Mar 2022 07:00  --------------------------------------------------------  IN: 300 mL / OUT: 0 mL / NET: 300 mL        Physical Exam:  Appearance: No acute distress  Eyes: Pink conjunctiva  HEENT: Normal oral mucosa  Cardiovascular: S1, S2, Mechanical click, no JVD ir edema   Respiratory: Clear to auscultation bilaterally  Gastrointestinal: soft, non-tender, non-distended with normal bowel sounds  Musculoskeletal: No clubbing; no joint deformity   Neurologic: Non-focal  Lymphatic: No lymphadenopathy  Psychiatry: mood & affect appropriate  Skin: No rashes, ecchymoses, or cyanosis                          8.6    18.60 )-----------( 153      ( 05 Mar 2022 07:46 )             24.2     03-05    138  |  107  |  37<H>  ----------------------------<  241<H>  4.3   |  21<L>  |  2.02<H>    Ca    8.0<L>      05 Mar 2022 07:46      PT/INR - ( 03 Mar 2022 15:18 )   PT: 13.4 sec;   INR: 1.15 ratio         PTT - ( 03 Mar 2022 15:18 )  PTT:35.4 sec       Overnight events:  Remained hemodynamically stable, CBI clear    Subjective:  Pt offers no complaints    Objective:    Vital signs  T(C): , Max: 37.1 (03-04-22 @ 09:00)  HR: 84 (03-04-22 @ 09:00)  BP: 114/58 (03-04-22 @ 09:00)  SpO2: 100% (03-04-22 @ 09:00)  Wt(kg): --    Output   CBI crystal clear  03-03 @ 07:01  -  03-04 @ 07:00  --------------------------------------------------------  IN: 1428 mL / OUT: 0 mL / NET: 1428 mL        Gen  Abd      Labs                        9.5    17.28 )-----------( 142      ( 04 Mar 2022 06:30 )             26.9     04 Mar 2022 06:30    135    |  104    |  33     ----------------------------<  346    4.1     |  17     |  1.56     Ca    7.5        04 Mar 2022 06:30        Urine Cx:   Blood Cx:     Imaging  Note    Post op Check    s/p Monopolar TURP, bleeding postop requiring CBI and traction, and hypotension, s/p return to OR for clot evac.  s/p 1U PRBCs post TURP, and 1U PRBCS intraop during clot evac.  Patient remains on CBI with traction. running clear.  Resolved hypotension.   Also with hyponatremia down to 128, asymptomatic, improved to 132.     Patient seen and examined without complaints, pain controlled, denies nausea.     Vital Signs Last 24 Hrs  T(C): 36.8 (04 Mar 2022 00:00), Max: 36.9 (03 Mar 2022 22:30)  T(F): 98.2 (04 Mar 2022 00:00), Max: 98.4 (03 Mar 2022 22:30)  HR: 90 (04 Mar 2022 01:00) (35 - 99)  BP: 98/60 (04 Mar 2022 01:00) (79/50 - 172/105)  BP(mean): 68 (04 Mar 2022 01:00) (57 - 119)  RR: 20 (04 Mar 2022 01:00) (16 - 30)  SpO2: 99% (04 Mar 2022 01:00) (91% - 100%)    I&O's Summary    03 Mar 2022 07:01  -  04 Mar 2022 02:23  --------------------------------------------------------  IN: 778 mL / OUT: 0 mL / NET: 778 mL    PHYSICAL EXAM:   Constitutional: well appearing, A+O, no distress    Respiratory: clear     Cardiovascular: irregular    Gastrointestinal: soft, minimal distention, no tenderness    Genitourinary: garcia with CBI, running clear, traction present, and removed during this post-op check.  Stat lock placed and garcia secured to leg, CBI remaining clear.     Extremities: venodynes in place.     LABS:                       9.7    17.79 )-----------( 125      ( 04 Mar 2022 00:41 )             26.8       03-04    132<L>  |  102  |  27<H>  ----------------------------<  332<H>  3.7   |  16<L>  |  1.24    Ca    7.2<L>      04 Mar 2022 00:41    Hematocrit: 27.5 % (03.03.22 @ 18:50)   Hematocrit: 31.3 % (03.03.22 @ 15:14)   Hematocrit, Calculated: 37.0 % (03.03.22 @ 14:09)  Dr. Adela Cordero  Pager [phone]    PROGRESS NOTE:     Patient is a 74y old  Male who presents with a chief complaint of s/p TURP (03 Mar 2022 15:51)      SUBJECTIVE / OVERNIGHT EVENTS: Seen and examined. Feels well. NO more bleeding. Understands that he needs lovenox and coumadin until INR >2.5. He will follow up with pcp for INR checks. Wants to go home today. No new concerns     ADDITIONAL REVIEW OF SYSTEMS: no chest pain/sob    MEDICATIONS  (STANDING):  ATENolol  Tablet 50 milliGRAM(s) Oral daily  ciprofloxacin   IVPB 400 milliGRAM(s) IV Intermittent every 12 hours  dextrose 40% Gel 15 Gram(s) Oral once  dextrose 5%. 1000 milliLiter(s) (50 mL/Hr) IV Continuous <Continuous>  dextrose 5%. 1000 milliLiter(s) (100 mL/Hr) IV Continuous <Continuous>  dextrose 50% Injectable 25 Gram(s) IV Push once  dextrose 50% Injectable 12.5 Gram(s) IV Push once  dextrose 50% Injectable 25 Gram(s) IV Push once  digoxin     Tablet 125 MICROGram(s) Oral daily  enoxaparin Injectable 120 milliGRAM(s) SubCutaneous every 24 hours  famotidine    Tablet 20 milliGRAM(s) Oral two times a day  glucagon  Injectable 1 milliGRAM(s) IntraMuscular once  insulin lispro (ADMELOG) corrective regimen sliding scale   SubCutaneous three times a day before meals  insulin lispro (ADMELOG) corrective regimen sliding scale   SubCutaneous at bedtime    MEDICATIONS  (PRN):  HYDROmorphone  Injectable 0.5 milliGRAM(s) IV Push every 10 minutes PRN Moderate Pain (4 - 6)  HYDROmorphone  Injectable 0.5 milliGRAM(s) IV Push every 10 minutes PRN Moderate Pain (4 - 6)  HYDROmorphone  Injectable 1 milliGRAM(s) IV Push every 10 minutes PRN Severe Pain (7 - 10)  ondansetron Injectable 4 milliGRAM(s) IV Push once PRN Nausea and/or Vomiting  oxyCODONE    IR 5 milliGRAM(s) Oral once PRN Moderate Pain (4 - 6)    CAPILLARY BLOOD GLUCOSE  POCT Blood Glucose.: 214 mg/dL (06 Mar 2022 07:20)  POCT Blood Glucose.: 177 mg/dL (05 Mar 2022 22:15)  POCT Blood Glucose.: 209 mg/dL (05 Mar 2022 16:38)  POCT Blood Glucose.: 312 mg/dL (05 Mar 2022 10:59)    I&O's Summary    05 Mar 2022 07:01  -  06 Mar 2022 07:00  --------------------------------------------------------  IN: 0 mL / OUT: 2400 mL / NET: -2400 mL    PHYSICAL EXAM:  Vital Signs Last 24 Hrs  T(C): 36.9 (06 Mar 2022 05:16), Max: 37.5 (05 Mar 2022 18:16)  T(F): 98.4 (06 Mar 2022 05:16), Max: 99.5 (05 Mar 2022 18:16)  HR: 80 (06 Mar 2022 05:16) (80 - 99)  BP: 132/60 (06 Mar 2022 05:16) (103/56 - 132/60)  BP(mean): --  RR: 18 (06 Mar 2022 05:16) (18 - 18)  SpO2: 98% (06 Mar 2022 05:16) (97% - 99%)  GENERAL: NAD, well-developed  HEAD:  Atraumatic, Normocephalic  EYES: EOMI, PERRLA, conjunctiva and sclera clear  NECK: Supple, No JVD  CHEST/LUNG: Clear to auscultation bilaterally; No wheeze  HEART: Regular rate and rhythm; Mechanical click  ABDOMEN: Soft, nontender, nondistended   garcia with clear urine  EXTREMITIES:  2+ Peripheral Pulses, No clubbing, cyanosis, or edema  PSYCH: AAOx3  NEUROLOGY: non-focal  SKIN: No rashes or lesions    LABS:                                   7.7    14.05 )-----------( 145      ( 06 Mar 2022 07:03 )             22.7   03-06    140  |  109<H>  |  37<H>  ----------------------------<  233<H>  4.0   |  22  |  2.10<H>    Ca    8.0<L>      06 Mar 2022 07:03        RADIOLOGY & ADDITIONAL TESTS:  Results Reviewed:   Imaging Personally Reviewed:    Electrocardiogram Personally Reviewed:    COORDINATION OF CARE:  Care Discussed with Consultants/Other Providers [Y/N]: Urology resident   Prior or Outpatient Records Reviewed [Y/N]:   Overnight Events:   No overnight events    Current Meds:  ATENolol  Tablet 50 milliGRAM(s) Oral daily  ciprofloxacin   IVPB 400 milliGRAM(s) IV Intermittent every 12 hours  dextrose 40% Gel 15 Gram(s) Oral once  dextrose 5%. 1000 milliLiter(s) IV Continuous <Continuous>  dextrose 5%. 1000 milliLiter(s) IV Continuous <Continuous>  dextrose 50% Injectable 25 Gram(s) IV Push once  dextrose 50% Injectable 12.5 Gram(s) IV Push once  dextrose 50% Injectable 25 Gram(s) IV Push once  digoxin     Tablet 125 MICROGram(s) Oral daily  enoxaparin Injectable 120 milliGRAM(s) SubCutaneous every 24 hours  famotidine    Tablet 20 milliGRAM(s) Oral two times a day  glucagon  Injectable 1 milliGRAM(s) IntraMuscular once  insulin glargine Injectable (LANTUS) 18 Unit(s) SubCutaneous at bedtime  insulin lispro (ADMELOG) corrective regimen sliding scale   SubCutaneous three times a day before meals  insulin lispro (ADMELOG) corrective regimen sliding scale   SubCutaneous at bedtime  insulin lispro Injectable (ADMELOG) 2 Unit(s) SubCutaneous three times a day before meals  sodium chloride 0.9%. 1000 milliLiter(s) IV Continuous <Continuous>        Vitals:  ICU Vital Signs Last 24 Hrs  T(C): 36.9 (06 Mar 2022 05:16), Max: 37.5 (05 Mar 2022 18:16)  T(F): 98.4 (06 Mar 2022 05:16), Max: 99.5 (05 Mar 2022 18:16)  HR: 80 (06 Mar 2022 05:16) (80 - 99)  BP: 132/60 (06 Mar 2022 05:16) (103/56 - 132/60)  BP(mean): --  ABP: --  ABP(mean): --  RR: 18 (06 Mar 2022 05:16) (17 - 18)  SpO2: 98% (06 Mar 2022 05:16) (97% - 100%)      I&O's Summary    05 Mar 2022 07:01  -  06 Mar 2022 07:00  --------------------------------------------------------  IN: 0 mL / OUT: 2400 mL / NET: -2400 mL            Physical Exam:  Appearance: No acute distress; well appearing  Eyes: PERRL, EOMI, pink conjunctiva  HENT: Normal oral mucosa  Cardiovascular: RRR, S1, S2, no murmurs, rubs, or gallops; no edema; no JVD  Respiratory: Clear to auscultation bilaterally  Gastrointestinal: soft, non-tender, non-distended with normal bowel sounds  Musculoskeletal: No clubbing; no joint deformity   Neurologic: Non-focal  Lymphatic: No lymphadenopathy  Psychiatry: AAOx3, mood & affect appropriate  Skin: No rashes, ecchymoses, or cyanosis                          7.7    14.05 )-----------( 145      ( 06 Mar 2022 07:03 )             22.7     03-06    140  |  109<H>  |  37<H>  ----------------------------<  233<H>  4.0   |  22  |  2.10<H>    Ca    8.0<L>      06 Mar 2022 07:03                    New ECG(s): Personally reviewed    Echo:    Stress Testing:     Cath:    Imaging:    Interpretation of Telemetry:  not on tele Overnight events:  None, CBI remained crystal clear    Subjective:  Pt offers no complaints    Objective:    Vital signs  T(C): , Max: 37.2 (03-04-22 @ 22:41)  HR: 98 (03-05-22 @ 09:55)  BP: 103/56 (03-05-22 @ 10:13)  SpO2: 100% (03-05-22 @ 09:55)  Wt(kg): --    Output   CBI crystal clear  03-04 @ 07:01  -  03-05 @ 07:00  --------------------------------------------------------  IN: 300 mL / OUT: 0 mL / NET: 300 mL        Gen: NAD  Abd: soft, nontender  : [facility]    Labs                        8.6    18.60 )-----------( 153      ( 05 Mar 2022 07:46 )             24.2     05 Mar 2022 07:46    138    |  107    |  37     ----------------------------<  241    4.3     |  21     |  2.02     Ca    8.0        05 Mar 2022 07:46         Subjective  Feels well without complaints.    Objective    Vital signs  T(F): , Max: 99.5 (03-05-22 @ 18:16)  HR: 67 (03-06-22 @ 10:00)  BP: 101/67 (03-06-22 @ 10:00)  SpO2: 99% (03-06-22 @ 10:00)  Wt(kg): --    Output     OUT:    Indwelling Catheter - Urethral (mL): 2400 mL  Total OUT: 2400 mL    Total NET: -2400 mL      OUT:    Indwelling Catheter - Urethral (mL): 500 mL  Total OUT: 500 mL    Total NET: -500 mL          Gen: NAD  Abd: soft, nontender, nondistended  : garcia secured in place, draining clear yellow urine    Labs      03-06 @ 07:03    WBC 14.05 / Hct 22.7  / SCr 2.10     03-05 @ 07:46    WBC 18.60 / Hct 24.2  / SCr 2.02 Dr. Adela Cordero  Pager [phone]    PROGRESS NOTE:     Patient is a 74y old  Male who presents with a chief complaint of s/p TURP (03 Mar 2022 15:51)      SUBJECTIVE / OVERNIGHT EVENTS: Seen and examined. Wife at bedside. No further bleeding. Feels well. Some pain in back and right leg. No other concerns.     ADDITIONAL REVIEW OF SYSTEMS: no chest pain/sob    MEDICATIONS  (STANDING):  ATENolol  Tablet 50 milliGRAM(s) Oral daily  ciprofloxacin   IVPB 400 milliGRAM(s) IV Intermittent every 12 hours  dextrose 40% Gel 15 Gram(s) Oral once  dextrose 5%. 1000 milliLiter(s) (50 mL/Hr) IV Continuous <Continuous>  dextrose 5%. 1000 milliLiter(s) (100 mL/Hr) IV Continuous <Continuous>  dextrose 50% Injectable 25 Gram(s) IV Push once  dextrose 50% Injectable 12.5 Gram(s) IV Push once  dextrose 50% Injectable 25 Gram(s) IV Push once  digoxin     Tablet 125 MICROGram(s) Oral daily  enoxaparin Injectable 120 milliGRAM(s) SubCutaneous every 24 hours  famotidine    Tablet 20 milliGRAM(s) Oral two times a day  glucagon  Injectable 1 milliGRAM(s) IntraMuscular once  insulin lispro (ADMELOG) corrective regimen sliding scale   SubCutaneous three times a day before meals  insulin lispro (ADMELOG) corrective regimen sliding scale   SubCutaneous at bedtime    MEDICATIONS  (PRN):  HYDROmorphone  Injectable 0.5 milliGRAM(s) IV Push every 10 minutes PRN Moderate Pain (4 - 6)  HYDROmorphone  Injectable 0.5 milliGRAM(s) IV Push every 10 minutes PRN Moderate Pain (4 - 6)  HYDROmorphone  Injectable 1 milliGRAM(s) IV Push every 10 minutes PRN Severe Pain (7 - 10)  ondansetron Injectable 4 milliGRAM(s) IV Push once PRN Nausea and/or Vomiting  oxyCODONE    IR 5 milliGRAM(s) Oral once PRN Moderate Pain (4 - 6)    CAPILLARY BLOOD GLUCOSE  POCT Blood Glucose.: 312 mg/dL (05 Mar 2022 10:59)  POCT Blood Glucose.: 232 mg/dL (05 Mar 2022 07:25)  POCT Blood Glucose.: 230 mg/dL (04 Mar 2022 22:17)  POCT Blood Glucose.: 258 mg/dL (04 Mar 2022 16:52)      I&O's Summary    04 Mar 2022 07:01  -  05 Mar 2022 07:00  --------------------------------------------------------  IN: 300 mL / OUT: 0 mL / NET: 300 mL    PHYSICAL EXAM:  Vital Signs Last 24 Hrs  T(C): 36.8 (05 Mar 2022 09:55), Max: 37.2 (04 Mar 2022 22:41)  T(F): 98.3 (05 Mar 2022 09:55), Max: 99 (04 Mar 2022 22:41)  HR: 98 (05 Mar 2022 09:55) (86 - 105)  BP: 103/56 (05 Mar 2022 10:13) (95/50 - 139/80)  BP(mean): 67 (04 Mar 2022 15:00) (63 - 84)  RR: 17 (05 Mar 2022 09:55) (17 - 22)  SpO2: 100% (05 Mar 2022 09:55) (98% - 100%)  GENERAL: NAD, well-developed  HEAD:  Atraumatic, Normocephalic  EYES: EOMI, PERRLA, conjunctiva and sclera clear  NECK: Supple, No JVD  CHEST/LUNG: Clear to auscultation bilaterally; No wheeze  HEART: Regular rate and rhythm; Mechanical click  ABDOMEN: Soft, nontender, nondistended   garcia with clear urine  EXTREMITIES:  2+ Peripheral Pulses, No clubbing, cyanosis, or edema  PSYCH: AAOx3  NEUROLOGY: non-focal  SKIN: No rashes or lesions    LABS:                                   8.6    18.60 )-----------( 153      ( 05 Mar 2022 07:46 )             24.2   03-05    138  |  107  |  37<H>  ----------------------------<  241<H>  4.3   |  21<L>  |  2.02<H>    Ca    8.0<L>      05 Mar 2022 07:46      RADIOLOGY & ADDITIONAL TESTS:  Results Reviewed:   Imaging Personally Reviewed:    Electrocardiogram Personally Reviewed:    COORDINATION OF CARE:  Care Discussed with Consultants/Other Providers [Y/N]: Urology FRANCISCA Welch  Prior or Outpatient Records Reviewed [Y/N]:   detailed exam

## 2023-03-06 NOTE — ED ADULT NURSE NOTE - BREATHING, MLM
CC:  Temi S  is here today for Follow-up (F/u nasal congestion)    Medications: medications verified and updated  Added preferred pharmacy  Denies  known Latex allergy or symptoms of Latex sensitivity.  All allergies and medications reviewed.  Patient would like communication of their results via:        Cell Phone:   Telephone Information:   Mobile [phone]     Okay to leave a message containing results? Yes      Rooming documentation of social history includes tobacco screening only.   Spontaneous, unlabored and symmetrical

## 2023-10-28 NOTE — ED PROVIDER NOTE - CPE EDP NEURO NORM
Patient with Persistent (7 days or more) atrial fibrillation which is uncontrolled currently with Amiodarone. Patient is currently in atrial fibrillation.[id number] Score: 5.  Anticoagulation indicated. Anticoagulation done with eliquis.    -history of atrial fibrillation. On amiodarone outpatient  -Pt now with elevated LFTs and prolonged   -Cardiology consulted: Stop amiodarone with prolonged QT. Attempt rate control with metoprolol as tolerated. Resume eliquis    normal...

## 2024-01-01 NOTE — ASU PREOP CHECKLIST - ALLERGIES REVIEWED
CC: No chief complaint on file.       SUBJECTIVE:    Morgan is a 25 year old male who presents to [facility] with respiratory symptoms which have been present for several days. Symptoms include: body aches, cough described as congested, diarrhea, headache, nasal congestion and nasal discharge. Denies: abdominal pain, appetite loss, dizziness, earache, nausea, vomiting or wheezing.     Alleviating factors: None     The remainder of the ROS is negative.    There is no problem list on file for this patient.    Social Hx: non-smoking household  ALLERGIES:  Patient has no allergy information on record.    OBJECTIVE:    There were no vitals filed for this visit.   Gen: Alert, well hydrated, in no apparent distress  Ears: TM's intact without erythema, bulging, retraction, or fluid-level. External auditory canal clear  Conjunctiva: clear without injection or discharge  Nose: nasal mucosa moist, pink, without rhinorrhea or sinus tenderness  Neck: supple without cervical adenopathy. No meningismus  Heart: regular rate and rhythm without murmur, rub or gallop  Lungs: clear to auscultation without wheezes, rhonchi or rales; normal respiratory effort   Skin: smooth without rash or edema and capillary refill is normal    COVID-19 rapid test negative. Results were reviewed and discussed during patient evaluation    ASSESSMENT/PLAN:    URI - Discussed likely etiology and OTC medication options and their risks and benefits. May use tylenol as directed for pain or fever.Those who have had significant exposure to a COVID-19 patient should also quarantine for 14 days from last significant exposure. Discussed proper quarantine processes at home. Covid-19 must be suspected for any person who has respiratory symptoms, unexplained fever, or other Covid-like symptoms without a clear alternative diagnosis/cause at this time, and must quarantine for a minimum of 10 days from symptom onset AND must also continue to quarantine until fever  free for at least 1 day without fever reducer, OR until tested 'COVID-19 negative' by PCR while remaining fever free for a minimum of 24 hours without fever reducer support.    Increase hydration. Advised to return to the [facility] or follow-up with primary care provider for reevaluation if symptoms worsen or are not improving in 7 days.    Diagnosis and prognosis, treatment and side-effects were explained and all questions were answered satisfactorily and there were no further questions upon discharge.    Electronically signed by: Anthony Morgan DO  11/9/2020    26.92 done

## 2025-01-31 NOTE — PROGRESS NOTE ADULT - PROBLEM SELECTOR PLAN 6
Transitions of Care Status:  1.  Name of PCP: Dr Jaime Rossi [phone] (in [place])  2.  PCP Contacted on Admission: [ ] Y    [X ] N    3.  PCP contacted at Discharge: [ ] Y    [ ] N    [ ] N/A  4.  Post-Discharge Appointment Date and Location:  5.  Summary of Handoff given to PCP: normal (ped)...

## 2025-03-04 NOTE — H&P PST ADULT - HISTORY OF PRESENT ILLNESS
Problem: Safety - Adult  Goal: Free from fall injury  Outcome: Progressing      69 y/o male with H/O DM type 2, HTN, Diverticulitis, c/o stomach discomfort on 10/2020 - went [place] [facility] referred to [facility] s/p Ct scan of abdomen "they found a cyst on the pancreas". 71 y/o male with H/O: HTN, DM type 2, INGRID on CPAP, Diverticulitis, BPH presents to PST for pre op evaluation with c/o stomach discomfort on 10/2020. Pt stated that he went to [place] [facility] who referred to [facility] - s/p Ct scan of abdomen "they found a cyst on the pancreas". Now scheduled for endoscopic ultrasound fine needle aspiration anesthesia on 11/23/2020